# Patient Record
Sex: MALE | Race: WHITE | Employment: FULL TIME | URBAN - METROPOLITAN AREA
[De-identification: names, ages, dates, MRNs, and addresses within clinical notes are randomized per-mention and may not be internally consistent; named-entity substitution may affect disease eponyms.]

---

## 2017-05-05 ENCOUNTER — OFFICE VISIT (OUTPATIENT)
Dept: FAMILY MEDICINE CLINIC | Age: 57
End: 2017-05-05

## 2017-05-05 VITALS
SYSTOLIC BLOOD PRESSURE: 138 MMHG | DIASTOLIC BLOOD PRESSURE: 76 MMHG | TEMPERATURE: 98.3 F | BODY MASS INDEX: 28.73 KG/M2 | RESPIRATION RATE: 14 BRPM | HEART RATE: 62 BPM | HEIGHT: 69 IN | WEIGHT: 194 LBS

## 2017-05-05 DIAGNOSIS — R53.83 OTHER MALAISE AND FATIGUE: ICD-10-CM

## 2017-05-05 DIAGNOSIS — R23.2 HOT FLASH IN MALE: ICD-10-CM

## 2017-05-05 DIAGNOSIS — R42 LIGHTHEADED: ICD-10-CM

## 2017-05-05 DIAGNOSIS — Z76.89 ESTABLISHING CARE WITH NEW DOCTOR, ENCOUNTER FOR: ICD-10-CM

## 2017-05-05 DIAGNOSIS — Z00.00 ANNUAL PHYSICAL EXAM: Primary | ICD-10-CM

## 2017-05-05 DIAGNOSIS — R53.81 OTHER MALAISE AND FATIGUE: ICD-10-CM

## 2017-05-05 DIAGNOSIS — R68.82 LIBIDO, DECREASED: ICD-10-CM

## 2017-05-05 LAB
BASOPHILS ABSOLUTE: 0 K/UL (ref 0–0.2)
BASOPHILS RELATIVE PERCENT: 0.4 %
CHOLESTEROL, TOTAL: 121 MG/DL (ref 0–199)
EOSINOPHILS ABSOLUTE: 0.3 K/UL (ref 0–0.7)
EOSINOPHILS RELATIVE PERCENT: 4.2 %
HCT VFR BLD CALC: 46 % (ref 42–52)
HDLC SERPL-MCNC: 53 MG/DL (ref 40–59)
HEMOGLOBIN: 15.6 G/DL (ref 14–18)
LDL CHOLESTEROL CALCULATED: 48 MG/DL (ref 0–129)
LYMPHOCYTES ABSOLUTE: 1.5 K/UL (ref 1–4.8)
LYMPHOCYTES RELATIVE PERCENT: 20.6 %
MCH RBC QN AUTO: 29.6 PG (ref 27–31.3)
MCHC RBC AUTO-ENTMCNC: 33.8 % (ref 33–37)
MCV RBC AUTO: 87.4 FL (ref 80–100)
MONOCYTES ABSOLUTE: 0.7 K/UL (ref 0.2–0.8)
MONOCYTES RELATIVE PERCENT: 10.5 %
NEUTROPHILS ABSOLUTE: 4.6 K/UL (ref 1.4–6.5)
NEUTROPHILS RELATIVE PERCENT: 64.3 %
PDW BLD-RTO: 14.1 % (ref 11.5–14.5)
PLATELET # BLD: 257 K/UL (ref 130–400)
PROSTATE SPECIFIC ANTIGEN: 4.32 NG/ML (ref 0–3.89)
RBC # BLD: 5.26 M/UL (ref 4.7–6.1)
TRIGL SERPL-MCNC: 98 MG/DL (ref 0–200)
TSH SERPL DL<=0.05 MIU/L-ACNC: 0.65 UIU/ML (ref 0.27–4.2)
WBC # BLD: 7.1 K/UL (ref 4.8–10.8)

## 2017-05-05 PROCEDURE — 93000 ELECTROCARDIOGRAM COMPLETE: CPT | Performed by: FAMILY MEDICINE

## 2017-05-05 PROCEDURE — 99386 PREV VISIT NEW AGE 40-64: CPT | Performed by: FAMILY MEDICINE

## 2017-05-05 ASSESSMENT — PATIENT HEALTH QUESTIONNAIRE - PHQ9
SUM OF ALL RESPONSES TO PHQ9 QUESTIONS 1 & 2: 0
1. LITTLE INTEREST OR PLEASURE IN DOING THINGS: 0
2. FEELING DOWN, DEPRESSED OR HOPELESS: 0
SUM OF ALL RESPONSES TO PHQ QUESTIONS 1-9: 0

## 2017-05-06 LAB — TESTOSTERONE TOTAL-MALE: 481 NG/DL (ref 300–890)

## 2017-11-13 ENCOUNTER — TELEPHONE (OUTPATIENT)
Dept: FAMILY MEDICINE CLINIC | Age: 57
End: 2017-11-13

## 2017-11-13 ENCOUNTER — OFFICE VISIT (OUTPATIENT)
Dept: FAMILY MEDICINE CLINIC | Age: 57
End: 2017-11-13

## 2017-11-13 VITALS
WEIGHT: 185.44 LBS | DIASTOLIC BLOOD PRESSURE: 76 MMHG | BODY MASS INDEX: 27.46 KG/M2 | HEIGHT: 69 IN | OXYGEN SATURATION: 95 % | TEMPERATURE: 96.4 F | SYSTOLIC BLOOD PRESSURE: 120 MMHG | HEART RATE: 60 BPM | RESPIRATION RATE: 12 BRPM

## 2017-11-13 DIAGNOSIS — R20.2 PARESTHESIA OF HAND, BILATERAL: ICD-10-CM

## 2017-11-13 DIAGNOSIS — K21.00 GASTROESOPHAGEAL REFLUX DISEASE WITH ESOPHAGITIS: ICD-10-CM

## 2017-11-13 DIAGNOSIS — R97.20 ELEVATED PSA: ICD-10-CM

## 2017-11-13 DIAGNOSIS — R20.2 PARESTHESIA OF HAND, BILATERAL: Primary | ICD-10-CM

## 2017-11-13 LAB
FOLATE: 17.6 NG/ML (ref 7.3–26.1)
VITAMIN B-12: 1913 PG/ML (ref 211–946)
VITAMIN D 25-HYDROXY: 59.1 NG/ML (ref 30–100)

## 2017-11-13 PROCEDURE — 1036F TOBACCO NON-USER: CPT | Performed by: FAMILY MEDICINE

## 2017-11-13 PROCEDURE — 99213 OFFICE O/P EST LOW 20 MIN: CPT | Performed by: FAMILY MEDICINE

## 2017-11-13 PROCEDURE — 3017F COLORECTAL CA SCREEN DOC REV: CPT | Performed by: FAMILY MEDICINE

## 2017-11-13 PROCEDURE — G8427 DOCREV CUR MEDS BY ELIG CLIN: HCPCS | Performed by: FAMILY MEDICINE

## 2017-11-13 PROCEDURE — G8419 CALC BMI OUT NRM PARAM NOF/U: HCPCS | Performed by: FAMILY MEDICINE

## 2017-11-13 PROCEDURE — G8482 FLU IMMUNIZE ORDER/ADMIN: HCPCS | Performed by: FAMILY MEDICINE

## 2017-11-13 NOTE — PROGRESS NOTES
Subjective:      Patient ID: Ramila Harrison is a 56 y.o. male. Chief Complaint   Patient presents with    Other     Labs in May 2017 showed elevated PSA.  Numbness     both arms. Aggravated by using the arms. denies pain. Onset was a few years ago. BEA Musa is here today follow-up on his labs he needs his PSA rechecked it was slightly elevated      Been going to bathroom but more unusual no fevers chills eating drinking pretty well otherwise      Having some numbness in both arms failure by using his arms down after last few years but wanted. Hypertension her today      Does take a lot of muscle supplements as well as vitamin D and B12      Allergies   Allergen Reactions    Cephalosporins Rash     No outpatient encounter prescriptions on file as of 11/13/2017. No facility-administered encounter medications on file as of 11/13/2017. Social History     Social History    Marital status:      Spouse name: N/A    Number of children: N/A    Years of education: N/A     Occupational History    Not on file. Social History Main Topics    Smoking status: Never Smoker    Smokeless tobacco: Never Used    Alcohol use 0.6 oz/week     1 Cans of beer per week    Drug use: No    Sexual activity: Yes     Partners: Female     Other Topics Concern    Not on file     Social History Narrative    No narrative on file     Family History   Problem Relation Age of Onset    Cancer Mother      bladder cancer     High Blood Pressure Mother     High Blood Pressure Father     Cancer Father      liver cancer     No Known Problems Sister      History reviewed. No pertinent past medical history. Past Surgical History:   Procedure Laterality Date    NASAL SEPTUM SURGERY      TONSILLECTOMY           REVIEW OF SYSTEMS:   Patient seen today for exam.  Denies any problems with hearing, headaches or vision. Denies any shortness of breath, chest pain, nausea or vomiting.   No black stool, no blood in the stool. No heartburn. Denies any problems with constipation or diarrhea either. No dysuria type symptoms. Objective:     /76 (Site: Left Arm, Position: Sitting, Cuff Size: Medium Adult)   Pulse 60   Temp 96.4 °F (35.8 °C) (Temporal)   Resp 12   Ht 5' 9\" (1.753 m)   Wt 185 lb 7 oz (84.1 kg)   SpO2 95%   BMI 27.38 kg/m²     Physical Exam        O:  Alert and active male in no acute distress  HEENT:  TMs clear. Pharynx neg. Nares clear, no drainage noted  Neck supple/ no adenopathy   HEART:  RRR without murmur/ no carotid bruits  LUNGS:  Clear to auscultation bilaterally, no wheeze or rhonchi noted  THYROID: neg masses or nodularity  ABDOMEN:  Soft x4. Bowel sounds positive. No masses or organomegaly,  Negative tenderness, guarding or rebound. EXTR:  Without edema./ good pulses bilat    Neurologic exam unremarkable. DTRs in upper and lower extremities within normal limits. Full strength noted    Skin- no lesions noted   Bilateral arms full range of motion good strength all throughout positive Phalen's on the left hand    Assessment:      1. Paresthesia of hand, bilateral  Vitamin B12 & Folate    Vitamin D 25 Hydroxy   2. Elevated PSA  PSA, Total And Free   3. Gastroesophageal reflux disease with esophagitis               Plan:        No orders of the defined types were placed in this encounter.     Orders Placed This Encounter   Procedures    PSA, Total And Free     Standing Status:   Future     Number of Occurrences:   1     Standing Expiration Date:   11/13/2018    Vitamin B12 & Folate     Standing Status:   Future     Number of Occurrences:   1     Standing Expiration Date:   11/13/2018    Vitamin D 25 Hydroxy     Standing Status:   Future     Number of Occurrences:   1     Standing Expiration Date:   11/13/2018           Health Maintenance Due   Topic Date Due    Hepatitis C screen  1960    HIV screen  10/02/1977    Diabetes screen  10/02/2002    Colon cancer screen colonoscopy  10/02/2012             Controlled Substances Monitoring:       we will check his labs and see if anything is off his PSA is elevated        If anything worsens or changes please call us at once , follow-up in the office as planned,

## 2017-11-13 NOTE — TELEPHONE ENCOUNTER
Patient forgot to mention during his appt that since September 2017 he has an intermittent cough - productive of greenish sputum. No other symptoms along with the cough. Improved since onset. Would like to know if he needs meds or if this is something that will resolve on its own.     Please advise

## 2017-11-13 NOTE — TELEPHONE ENCOUNTER
He did not mention any of this to me when he was in the room we're talking about his arms PSA,, most the time to can be allergy symptoms so he should take some Claritin or Zyrtec and if things aren't improving we'll be glad to see him next week

## 2017-11-14 LAB
PROSTATE SPECIFIC ANTIGEN FREE: 0.9 NG/ML
PROSTATE SPECIFIC ANTIGEN PERCENT FREE: 22 %
PROSTATE SPECIFIC ANTIGEN: 4 NG/ML (ref 0–4)

## 2024-02-12 PROBLEM — Z00.00 ROUTINE GENERAL MEDICAL EXAMINATION AT A HEALTH CARE FACILITY: Status: ACTIVE | Noted: 2024-02-12

## 2024-02-12 PROBLEM — E55.9 VITAMIN D DEFICIENCY: Status: ACTIVE | Noted: 2024-02-12

## 2024-02-12 PROBLEM — M19.042 ARTHRITIS OF FINGER OF BOTH HANDS: Status: ACTIVE | Noted: 2024-02-12

## 2024-02-12 PROBLEM — M19.041 ARTHRITIS OF FINGER OF BOTH HANDS: Status: ACTIVE | Noted: 2024-02-12

## 2024-02-12 PROBLEM — M25.562 ACUTE PAIN OF LEFT KNEE: Status: ACTIVE | Noted: 2024-02-12

## 2024-02-12 PROBLEM — R97.20 ELEVATED PSA: Status: ACTIVE | Noted: 2024-02-12

## 2024-02-12 PROBLEM — K21.00 GASTROESOPHAGEAL REFLUX DISEASE WITH ESOPHAGITIS: Status: ACTIVE | Noted: 2017-11-13

## 2024-02-12 NOTE — H&P (VIEW-ONLY)
"Subjective   Patient ID: Betito Vicente is a 63 y.o. male who presents for Annual Exam and Hypertension.  HPI    Annual physical   Eats a generally healthy diet   Staying active   Denies any chest pain,SOB  No Abdominal pain   No black or bloody stools   Urination/BM normal   Last eye apt  3 weeks   Last dental apt 8/2023  No new family h/o cancers or heart disease     Pt is having BP issues being elevated  Ongoing for 2 years  Pt does not check BP at home    Denies any chest pain or SOB    No other concern / question     Review of systems  ; Patient seen today for exam denies any problems with headaches or vision, denies any shortness of breath chest pain nausea or vomiting, no black stool no blood in the stool no heartburn type symptoms denies any problems with constipation or diarrhea, and no dysuria-type symptoms    The patient's allergies medications were reviewed with them today    The patient's social family and surgical history or also reviewed here today, along with her past medical history.     Objective     Alert and active in  no acute distress  HEENT TMs clear oropharynx negative nares clear no drainage noted neck supple  With no adenopathy   Heart regular rate and rhythm without murmur and no carotid bruits  Lungs- clear to auscultation bilaterally, no wheeze or rhonchi noted  Thyroid -negative masses or nodularity  Abdomen- soft times four quadrants , bowel sounds positive no masses or organomegaly, negative tenderness guarding or rebound  Neurological exam unremarkable- DTRs in upper and lower extremities within normal limits.   skin -no lesions noted      /80 (BP Location: Left arm, Patient Position: Sitting, BP Cuff Size: Adult)   Pulse 56   Temp 36.4 °C (97.5 °F) (Temporal)   Resp 16   Ht 1.753 m (5' 9\")   Wt 83.9 kg (185 lb)   SpO2 97%   BMI 27.32 kg/m²     Allergies   Allergen Reactions    Cephalosporins Rash       Assessment/Plan   Problem List Items Addressed This Visit       " Elevated PSA    Relevant Orders    Prostate Specific Antigen, Screen    Vitamin D deficiency    Routine general medical examination at a health care facility    Relevant Orders    Lipid Panel    CBC and Auto Differential    Comprehensive Metabolic Panel    Tdap vaccine, age 7 years and older (Completed)    BMI 27.0-27.9,adult     Other Visit Diagnoses       Primary hypertension    -  Primary    Relevant Medications    valsartan (Diovan) 80 mg tablet    Colon cancer screening        Relevant Orders    Colonoscopy Screening; Average Risk Patient    Prostate cancer screening        Relevant Orders    Prostate Specific Antigen, Screen        Will see his back in 3 to 4 weeks discussed response to medication at that time would like to do an EKG and see where we are with things hopefully he will do his blood work his PSA was elevated recommended 2 years ago to check it every 6 months he did not do so states 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

## 2024-02-21 ENCOUNTER — OFFICE VISIT (OUTPATIENT)
Dept: PRIMARY CARE | Facility: CLINIC | Age: 64
End: 2024-02-21
Payer: COMMERCIAL

## 2024-02-21 VITALS
HEART RATE: 56 BPM | WEIGHT: 185 LBS | TEMPERATURE: 97.5 F | BODY MASS INDEX: 27.4 KG/M2 | SYSTOLIC BLOOD PRESSURE: 160 MMHG | DIASTOLIC BLOOD PRESSURE: 80 MMHG | HEIGHT: 69 IN | OXYGEN SATURATION: 97 % | RESPIRATION RATE: 16 BRPM

## 2024-02-21 DIAGNOSIS — E55.9 VITAMIN D DEFICIENCY: ICD-10-CM

## 2024-02-21 DIAGNOSIS — Z12.11 COLON CANCER SCREENING: ICD-10-CM

## 2024-02-21 DIAGNOSIS — Z12.5 PROSTATE CANCER SCREENING: ICD-10-CM

## 2024-02-21 DIAGNOSIS — M17.0 PRIMARY OSTEOARTHRITIS OF BOTH KNEES: ICD-10-CM

## 2024-02-21 DIAGNOSIS — Z00.00 ROUTINE GENERAL MEDICAL EXAMINATION AT A HEALTH CARE FACILITY: Primary | ICD-10-CM

## 2024-02-21 DIAGNOSIS — R97.20 ELEVATED PSA: ICD-10-CM

## 2024-02-21 DIAGNOSIS — I10 PRIMARY HYPERTENSION: ICD-10-CM

## 2024-02-21 PROCEDURE — 1036F TOBACCO NON-USER: CPT | Performed by: FAMILY MEDICINE

## 2024-02-21 PROCEDURE — 99396 PREV VISIT EST AGE 40-64: CPT | Performed by: FAMILY MEDICINE

## 2024-02-21 PROCEDURE — 90715 TDAP VACCINE 7 YRS/> IM: CPT | Performed by: FAMILY MEDICINE

## 2024-02-21 PROCEDURE — 3008F BODY MASS INDEX DOCD: CPT | Performed by: FAMILY MEDICINE

## 2024-02-21 PROCEDURE — 3079F DIAST BP 80-89 MM HG: CPT | Performed by: FAMILY MEDICINE

## 2024-02-21 PROCEDURE — 90471 IMMUNIZATION ADMIN: CPT | Performed by: FAMILY MEDICINE

## 2024-02-21 PROCEDURE — 3077F SYST BP >= 140 MM HG: CPT | Performed by: FAMILY MEDICINE

## 2024-02-21 RX ORDER — VALSARTAN 80 MG/1
80 TABLET ORAL DAILY
Qty: 30 TABLET | Refills: 0 | Status: SHIPPED | OUTPATIENT
Start: 2024-02-21 | End: 2024-03-22 | Stop reason: SDUPTHER

## 2024-02-21 ASSESSMENT — PATIENT HEALTH QUESTIONNAIRE - PHQ9
2. FEELING DOWN, DEPRESSED OR HOPELESS: NOT AT ALL
1. LITTLE INTEREST OR PLEASURE IN DOING THINGS: NOT AT ALL
SUM OF ALL RESPONSES TO PHQ9 QUESTIONS 1 AND 2: 0

## 2024-02-22 ENCOUNTER — APPOINTMENT (OUTPATIENT)
Dept: PRIMARY CARE | Facility: CLINIC | Age: 64
End: 2024-02-22
Payer: COMMERCIAL

## 2024-02-22 ENCOUNTER — TELEPHONE (OUTPATIENT)
Dept: PRIMARY CARE | Facility: CLINIC | Age: 64
End: 2024-02-22

## 2024-02-22 RX ORDER — DICLOFENAC SODIUM 10 MG/G
2 GEL TOPICAL 4 TIMES DAILY
Qty: 300 G | Refills: 2 | Status: SHIPPED | OUTPATIENT
Start: 2024-02-22 | End: 2024-03-22 | Stop reason: SDUPTHER

## 2024-02-22 NOTE — TELEPHONE ENCOUNTER
PATIENT CALLING, HE IS WANTING TO KNOW IF YOU CAN GIVE HIM A CALL. HE STATES THAT HE HAS A FEW QUESTIONS ABOUT HIS VISIT FROM YESTERDAY AND MEDICATION.    HE WOULD NOT PROVIDE ANY FURTHER INFORMATION.    PLEASE ADVISE.

## 2024-03-08 ENCOUNTER — HOSPITAL ENCOUNTER (OUTPATIENT)
Dept: GASTROENTEROLOGY | Facility: EXTERNAL LOCATION | Age: 64
Discharge: HOME | End: 2024-03-08
Payer: COMMERCIAL

## 2024-03-08 ENCOUNTER — ANESTHESIA (OUTPATIENT)
Dept: GASTROENTEROLOGY | Facility: EXTERNAL LOCATION | Age: 64
End: 2024-03-08

## 2024-03-08 ENCOUNTER — ANESTHESIA EVENT (OUTPATIENT)
Dept: GASTROENTEROLOGY | Facility: EXTERNAL LOCATION | Age: 64
End: 2024-03-08

## 2024-03-08 ENCOUNTER — LAB (OUTPATIENT)
Dept: LAB | Facility: LAB | Age: 64
End: 2024-03-08
Payer: COMMERCIAL

## 2024-03-08 VITALS
HEART RATE: 64 BPM | HEIGHT: 69 IN | WEIGHT: 189 LBS | OXYGEN SATURATION: 98 % | BODY MASS INDEX: 27.99 KG/M2 | RESPIRATION RATE: 15 BRPM | TEMPERATURE: 97.7 F | DIASTOLIC BLOOD PRESSURE: 90 MMHG | SYSTOLIC BLOOD PRESSURE: 134 MMHG

## 2024-03-08 DIAGNOSIS — Z12.11 COLON CANCER SCREENING: Primary | ICD-10-CM

## 2024-03-08 DIAGNOSIS — Z00.00 ROUTINE GENERAL MEDICAL EXAMINATION AT A HEALTH CARE FACILITY: ICD-10-CM

## 2024-03-08 DIAGNOSIS — Z12.5 PROSTATE CANCER SCREENING: ICD-10-CM

## 2024-03-08 DIAGNOSIS — R97.20 ELEVATED PSA: ICD-10-CM

## 2024-03-08 LAB
ALBUMIN SERPL BCP-MCNC: 4.8 G/DL (ref 3.4–5)
ALP SERPL-CCNC: 64 U/L (ref 33–136)
ALT SERPL W P-5'-P-CCNC: 35 U/L (ref 10–52)
ANION GAP SERPL CALC-SCNC: 11 MMOL/L (ref 10–20)
AST SERPL W P-5'-P-CCNC: 24 U/L (ref 9–39)
BASOPHILS # BLD AUTO: 0.04 X10*3/UL (ref 0–0.1)
BASOPHILS NFR BLD AUTO: 0.4 %
BILIRUB SERPL-MCNC: 0.7 MG/DL (ref 0–1.2)
BUN SERPL-MCNC: 10 MG/DL (ref 6–23)
CALCIUM SERPL-MCNC: 9.9 MG/DL (ref 8.6–10.3)
CHLORIDE SERPL-SCNC: 106 MMOL/L (ref 98–107)
CHOLEST SERPL-MCNC: 115 MG/DL (ref 0–199)
CHOLESTEROL/HDL RATIO: 2.6
CO2 SERPL-SCNC: 29 MMOL/L (ref 21–32)
CREAT SERPL-MCNC: 1.06 MG/DL (ref 0.5–1.3)
EGFRCR SERPLBLD CKD-EPI 2021: 80 ML/MIN/1.73M*2
EOSINOPHIL # BLD AUTO: 0.42 X10*3/UL (ref 0–0.7)
EOSINOPHIL NFR BLD AUTO: 4.6 %
ERYTHROCYTE [DISTWIDTH] IN BLOOD BY AUTOMATED COUNT: 13.4 % (ref 11.5–14.5)
GLUCOSE SERPL-MCNC: 101 MG/DL (ref 74–99)
HCT VFR BLD AUTO: 50.6 % (ref 41–52)
HDLC SERPL-MCNC: 45 MG/DL
HGB BLD-MCNC: 16.9 G/DL (ref 13.5–17.5)
IMM GRANULOCYTES # BLD AUTO: 0.02 X10*3/UL (ref 0–0.7)
IMM GRANULOCYTES NFR BLD AUTO: 0.2 % (ref 0–0.9)
LDLC SERPL CALC-MCNC: 48 MG/DL
LYMPHOCYTES # BLD AUTO: 1.63 X10*3/UL (ref 1.2–4.8)
LYMPHOCYTES NFR BLD AUTO: 17.7 %
MCH RBC QN AUTO: 29.3 PG (ref 26–34)
MCHC RBC AUTO-ENTMCNC: 33.4 G/DL (ref 32–36)
MCV RBC AUTO: 88 FL (ref 80–100)
MONOCYTES # BLD AUTO: 1.04 X10*3/UL (ref 0.1–1)
MONOCYTES NFR BLD AUTO: 11.3 %
NEUTROPHILS # BLD AUTO: 6.05 X10*3/UL (ref 1.2–7.7)
NEUTROPHILS NFR BLD AUTO: 65.8 %
NON HDL CHOLESTEROL: 70 MG/DL (ref 0–149)
NRBC BLD-RTO: 0 /100 WBCS (ref 0–0)
PLATELET # BLD AUTO: 275 X10*3/UL (ref 150–450)
POTASSIUM SERPL-SCNC: 4.9 MMOL/L (ref 3.5–5.3)
PROT SERPL-MCNC: 7 G/DL (ref 6.4–8.2)
PSA SERPL-MCNC: 6.8 NG/ML
RBC # BLD AUTO: 5.76 X10*6/UL (ref 4.5–5.9)
SODIUM SERPL-SCNC: 141 MMOL/L (ref 136–145)
TRIGL SERPL-MCNC: 109 MG/DL (ref 0–149)
VLDL: 22 MG/DL (ref 0–40)
WBC # BLD AUTO: 9.2 X10*3/UL (ref 4.4–11.3)

## 2024-03-08 PROCEDURE — 80053 COMPREHEN METABOLIC PANEL: CPT

## 2024-03-08 PROCEDURE — 80061 LIPID PANEL: CPT

## 2024-03-08 PROCEDURE — 45378 DIAGNOSTIC COLONOSCOPY: CPT | Performed by: INTERNAL MEDICINE

## 2024-03-08 PROCEDURE — 36415 COLL VENOUS BLD VENIPUNCTURE: CPT

## 2024-03-08 PROCEDURE — 84153 ASSAY OF PSA TOTAL: CPT

## 2024-03-08 PROCEDURE — 85025 COMPLETE CBC W/AUTO DIFF WBC: CPT

## 2024-03-08 RX ORDER — SODIUM CHLORIDE, SODIUM LACTATE, POTASSIUM CHLORIDE, CALCIUM CHLORIDE 600; 310; 30; 20 MG/100ML; MG/100ML; MG/100ML; MG/100ML
20 INJECTION, SOLUTION INTRAVENOUS CONTINUOUS
Status: DISCONTINUED | OUTPATIENT
Start: 2024-03-08 | End: 2024-03-09 | Stop reason: HOSPADM

## 2024-03-08 RX ORDER — SODIUM CHLORIDE 9 MG/ML
INJECTION, SOLUTION INTRAVENOUS CONTINUOUS PRN
Status: DISCONTINUED | OUTPATIENT
Start: 2024-03-08 | End: 2024-03-08

## 2024-03-08 RX ORDER — PROPOFOL 10 MG/ML
INJECTION, EMULSION INTRAVENOUS AS NEEDED
Status: DISCONTINUED | OUTPATIENT
Start: 2024-03-08 | End: 2024-03-08

## 2024-03-08 RX ADMIN — PROPOFOL 50 MG: 10 INJECTION, EMULSION INTRAVENOUS at 09:26

## 2024-03-08 RX ADMIN — PROPOFOL 50 MG: 10 INJECTION, EMULSION INTRAVENOUS at 09:30

## 2024-03-08 RX ADMIN — PROPOFOL 50 MG: 10 INJECTION, EMULSION INTRAVENOUS at 09:34

## 2024-03-08 RX ADMIN — SODIUM CHLORIDE: 9 INJECTION, SOLUTION INTRAVENOUS at 09:20

## 2024-03-08 RX ADMIN — PROPOFOL 150 MG: 10 INJECTION, EMULSION INTRAVENOUS at 09:23

## 2024-03-08 SDOH — HEALTH STABILITY: MENTAL HEALTH: CURRENT SMOKER: 0

## 2024-03-08 ASSESSMENT — PAIN SCALES - GENERAL
PAINLEVEL_OUTOF10: 0 - NO PAIN
PAINLEVEL_OUTOF10: 0 - NO PAIN
PAIN_LEVEL: 0
PAINLEVEL_OUTOF10: 0 - NO PAIN
PAINLEVEL_OUTOF10: 0 - NO PAIN

## 2024-03-08 ASSESSMENT — COLUMBIA-SUICIDE SEVERITY RATING SCALE - C-SSRS
6. HAVE YOU EVER DONE ANYTHING, STARTED TO DO ANYTHING, OR PREPARED TO DO ANYTHING TO END YOUR LIFE?: NO
2. HAVE YOU ACTUALLY HAD ANY THOUGHTS OF KILLING YOURSELF?: NO
1. IN THE PAST MONTH, HAVE YOU WISHED YOU WERE DEAD OR WISHED YOU COULD GO TO SLEEP AND NOT WAKE UP?: NO

## 2024-03-08 ASSESSMENT — PAIN - FUNCTIONAL ASSESSMENT
PAIN_FUNCTIONAL_ASSESSMENT: 0-10

## 2024-03-08 NOTE — DISCHARGE INSTRUCTIONS
Patient Instructions after a Colonoscopy      The anesthetics, sedatives or narcotics which were given to you today will be acting in your body for the next 24 hours, so you might feel a little sleepy or groggy.  This feeling should slowly wear off. Carefully read and follow the instructions.     You received sedation today:  - Do not drive or operate any machinery or power tools of any kind.   - No alcoholic beverages today, not even beer or wine.  - Do not make any important decisions or sign any legal documents.  - No over the counter medications that contain alcohol or that may cause drowsiness.  - Do not make any important decisions or sign any legal documents.    While it is common to experience mild to moderate abdominal distention, gas, or belching after your procedure, if any of these symptoms occur following discharge from the GI Lab or within one week of having your procedure, call the [facility] to be advised whether a visit to your nearest Urgent Care or Emergency Department is indicated.  Take this paper with you if you go.     - If you develop an allergic reaction to the medications that were given during your procedure such as difficulty breathing, rash, hives, severe nausea, vomiting or lightheadedness.  - If you experience chest pain, shortness of breath, severe abdominal pain, fevers and chills.  -If you develop signs and symptoms of bleeding such as blood in your spit, if your stools turn black, tarry, or bloody  - If you have not urinated within 8 hours following your procedure.  - If your IV site becomes painful, red, inflamed, or looks infected.    If you received a biopsy/polypectomy/sphincterotomy the following instructions apply below:    __ Do not use Aspirin containing products, non-steroidal medications or anti-coagulants for one week following your procedure. (Examples of these types of medications are: Advil, Arthrotec, Aleve, Coumadin, Ecotrin, Heparin, Ibuprofen,  Indocin, Motrin, Naprosyn, Nuprin, Plavix, Vioxx, and Voltarin, or their generic forms.  This list is not all-inclusive.  Check with your physician or pharmacist before resuming medications.)   __ Eat a soft diet today.  Avoid foods that are poorly digested for the next 24 hours.  These foods would include: nuts, beans, lettuce, red meats, and fried foods. Start with liquids and advance your diet as tolerated, gradually work up to eating solids.   __ Do not have a Barium Study or Enema for one week.    Your physician recommends the additional following instructions:    -You have a contact number available for emergencies. The signs and symptoms of potential delayed complications were discussed with you. You may return to normal activities tomorrow.  -Resume your previous diet.  -Continue your present medications.   -We are waiting for your pathology results.  -Your physician has recommended a repeat colonoscopy (date to be determined after pending pathology results are reviewed) for surveillance based on pathology results.  -The findings and recommendations have been discussed with you.  -The findings and recommendations were discussed with your family.  - Please see Medication Reconciliation Form for new medication/medications prescribed.       If you experience any problems or have any questions following discharge from the GI Lab, please call:        Nurse Signature                                                                        Date___________________                                                                            Patient/Responsible Party Signature                                        Date___________________

## 2024-03-08 NOTE — ANESTHESIA POSTPROCEDURE EVALUATION
Patient: Betito Vicente    Procedure Summary       Date: 03/08/24 Room / Location: [facility]    Anesthesia Start: 0919 Anesthesia Stop: 0940    Procedure: COLONOSCOPY Diagnosis: Colon cancer screening    Scheduled Providers: Ron Mackenzie MD; Shilpi Jung RN; Jelly Starks MA; TANMAY Davies Responsible Provider: TANMAY Davies    Anesthesia Type: MAC ASA Status: 2            Anesthesia Type: MAC    Vitals Value Taken Time   /63 03/08/24 0939   Temp 36.5 °C (97.7 °F) 03/08/24 0939   Pulse 76 03/08/24 0939   Resp 18 03/08/24 0939   SpO2 98 % 03/08/24 0939       Anesthesia Post Evaluation    Patient location during evaluation: PACU  Patient participation: complete - patient participated  Level of consciousness: sleepy but conscious  Pain score: 0  Pain management: adequate  Airway patency: patent  Cardiovascular status: acceptable  Respiratory status: acceptable  Hydration status: acceptable  Postoperative Nausea and Vomiting: none        No notable events documented.

## 2024-03-08 NOTE — ANESTHESIA PREPROCEDURE EVALUATION
Patient: Betito Vicente    Procedure Information       Date/Time: 03/08/24 0900    Scheduled providers: Ron Mackenzie MD; Shilpi Jung RN; Jelly Starks MA; TANMAY Davies    Procedure: COLONOSCOPY    Location: [facility]            Relevant Problems   GI   (+) Gastroesophageal reflux disease with esophagitis      Other   (+) Arthritis of finger of both hands       Clinical information reviewed:   Tobacco  Allergies  Meds   Med Hx  Surg Hx   Fam Hx  Soc Hx      Vitals:    03/08/24 0840   BP: (!) 147/99   Pulse: 73   Resp: 13   Temp: 36.4 °C (97.5 °F)   SpO2: 96%       Past Surgical History:   Procedure Laterality Date    TONSILLECTOMY       Past Medical History:   Diagnosis Date    Hypertension        Current Outpatient Medications:     diclofenac sodium (Voltaren) 1 % gel, Apply 2.25 inches (2 g) topically 4 times a day., Disp: 300 g, Rfl: 2    valsartan (Diovan) 80 mg tablet, Take 1 tablet (80 mg) by mouth once daily., Disp: 30 tablet, Rfl: 0  Prior to Admission medications    Medication Sig Start Date End Date Taking? Authorizing Provider   diclofenac sodium (Voltaren) 1 % gel Apply 2.25 inches (2 g) topically 4 times a day. 2/22/24  Yes Manolo Escamilla DO   valsartan (Diovan) 80 mg tablet Take 1 tablet (80 mg) by mouth once daily. 2/21/24 2/20/25 Yes Manolo Escamilla DO     Allergies   Allergen Reactions    Cephalosporins Rash     Social History     Tobacco Use    Smoking status: Never    Smokeless tobacco: Never   Substance Use Topics    Alcohol use: Yes     Comment: very occ         Chemistry    Lab Results   Component Value Date/Time     08/01/2022 1150    K 4.4 08/01/2022 1150     08/01/2022 1150    CO2 27 08/01/2022 1150    BUN 22 08/01/2022 1150    CREATININE 1.06 08/01/2022 1150    Lab Results   Component Value Date/Time    CALCIUM 9.0 08/01/2022 1150    ALKPHOS 52 08/01/2022 1150    AST 26 08/01/2022 1150    ALT 35 08/01/2022 1150    BILITOT 0.4  "08/01/2022 1150          Lab Results   Component Value Date/Time    WBC 6.7 08/01/2022 1150    HGB 15.1 08/01/2022 1150    HCT 45.6 08/01/2022 1150     08/01/2022 1150     No results found for: \"PROTIME\", \"PTT\", \"INR\"  No results found for this or any previous visit (from the past 4464 hour(s)).    NPO Detail:  NPO/Void Status  Carbohydrate Drink Given Prior to Surgery? : N  Date of Last Liquid: 03/07/24  Time of Last Liquid: 2000  Date of Last Solid: 03/07/24  Time of Last Solid: 0400  Last Intake Type: Clear fluids  Time of Last Void: 0842         Physical Exam    Airway  Mallampati: IV  TM distance: >3 FB  Neck ROM: full     Cardiovascular   Rhythm: regular  Rate: normal     Dental    Pulmonary   Breath sounds clear to auscultation     Abdominal   Abdomen: soft  Bowel sounds: normal           Anesthesia Plan    History of general anesthesia?: yes  History of complications of general anesthesia?: no    ASA 2     MAC     The patient is not a current smoker.  Patient was not previously instructed to abstain from smoking on day of procedure.  Education provided regarding risk of obstructive sleep apnea.  intravenous induction   Anesthetic plan and risks discussed with patient.    Plan discussed with CRNA.      "

## 2024-03-11 ENCOUNTER — TELEPHONE (OUTPATIENT)
Dept: PRIMARY CARE | Facility: CLINIC | Age: 64
End: 2024-03-11
Payer: COMMERCIAL

## 2024-03-11 DIAGNOSIS — R97.20 ELEVATED PSA: ICD-10-CM

## 2024-03-11 NOTE — TELEPHONE ENCOUNTER
----- Message from Manolo Escamilla DO sent at 3/11/2024  4:29 PM EDT -----  Reviewed his labs do look okay except his prostate is elevated again,, 2 years ago was elevated,,, because of this I recommend he see the urologist Dr. Kumar to review things he may need a biopsy of his prostate

## 2024-03-11 NOTE — ADDENDUM NOTE
Encounter addended by: Nisha Vernon RN on: 3/11/2024 12:06 PM   Actions taken: Contacts section saved, Flowsheet accepted

## 2024-03-13 ENCOUNTER — APPOINTMENT (OUTPATIENT)
Dept: PRIMARY CARE | Facility: CLINIC | Age: 64
End: 2024-03-13
Payer: COMMERCIAL

## 2024-03-15 ENCOUNTER — APPOINTMENT (OUTPATIENT)
Dept: PRIMARY CARE | Facility: CLINIC | Age: 64
End: 2024-03-15
Payer: COMMERCIAL

## 2024-03-22 ENCOUNTER — OFFICE VISIT (OUTPATIENT)
Dept: PRIMARY CARE | Facility: CLINIC | Age: 64
End: 2024-03-22
Payer: COMMERCIAL

## 2024-03-22 VITALS
DIASTOLIC BLOOD PRESSURE: 72 MMHG | RESPIRATION RATE: 16 BRPM | BODY MASS INDEX: 28.53 KG/M2 | WEIGHT: 192.6 LBS | OXYGEN SATURATION: 96 % | SYSTOLIC BLOOD PRESSURE: 132 MMHG | TEMPERATURE: 98.9 F | HEIGHT: 69 IN | HEART RATE: 58 BPM

## 2024-03-22 DIAGNOSIS — I10 PRIMARY HYPERTENSION: Primary | ICD-10-CM

## 2024-03-22 DIAGNOSIS — R97.20 ABNORMAL PSA: ICD-10-CM

## 2024-03-22 DIAGNOSIS — M17.0 PRIMARY OSTEOARTHRITIS OF BOTH KNEES: ICD-10-CM

## 2024-03-22 PROCEDURE — 3078F DIAST BP <80 MM HG: CPT | Performed by: FAMILY MEDICINE

## 2024-03-22 PROCEDURE — 3008F BODY MASS INDEX DOCD: CPT | Performed by: FAMILY MEDICINE

## 2024-03-22 PROCEDURE — 99214 OFFICE O/P EST MOD 30 MIN: CPT | Performed by: FAMILY MEDICINE

## 2024-03-22 PROCEDURE — 1036F TOBACCO NON-USER: CPT | Performed by: FAMILY MEDICINE

## 2024-03-22 PROCEDURE — 3075F SYST BP GE 130 - 139MM HG: CPT | Performed by: FAMILY MEDICINE

## 2024-03-22 RX ORDER — DICLOFENAC SODIUM 10 MG/G
2 GEL TOPICAL 4 TIMES DAILY
Qty: 600 G | Refills: 1 | Status: SHIPPED | OUTPATIENT
Start: 2024-03-22

## 2024-03-22 RX ORDER — VALSARTAN 80 MG/1
80 TABLET ORAL DAILY
Qty: 90 TABLET | Refills: 1 | Status: SHIPPED | OUTPATIENT
Start: 2024-03-22 | End: 2025-03-22

## 2024-03-22 NOTE — PROGRESS NOTES
"Subjective   Patient ID: Betito Vicente is a 63 y.o. male who presents for Hypertension.    HPI    Patient presents today for HTN follow up.     He was prescribed Valsartan 80 mg at last office visit.   Denies any side effects with this medication.   Denies swelling, headaches.   When he first took the medication he did get lightheaded, but this has resolved.   Does not check BP at home.     Would like to go over lab results.     He did set up an appointment with urology. regarding his PSA    Review of systems  ; Patient seen today for exam denies any problems with headaches or vision, denies any shortness of breath chest pain nausea or vomiting, no black stool no blood in the stool no heartburn type symptoms denies any problems with constipation or diarrhea, and no dysuria-type symptoms    The patient's allergies medications were reviewed with them today    The patient's social family and surgical history or also reviewed here today, along with her past medical history.     Objective     Alert and active in  no acute distress  HEENT TMs clear oropharynx negative nares clear no drainage noted neck supple  With no adenopathy   Heart regular rate and rhythm without murmur and no carotid bruits  Lungs- clear to auscultation bilaterally, no wheeze or rhonchi noted  Thyroid -negative masses or nodularity  Abdomen- soft times four quadrants , bowel sounds positive no masses or organomegaly, negative tenderness guarding or rebound  Neurological exam unremarkable- DTRs in upper and lower extremities within normal limits.   skin -no lesions noted      /72 (BP Location: Left arm, Patient Position: Sitting, BP Cuff Size: Adult)   Pulse 58   Temp 37.2 °C (98.9 °F) (Temporal)   Resp 16   Ht 1.753 m (5' 9\")   Wt 87.4 kg (192 lb 9.6 oz)   SpO2 96%   BMI 28.44 kg/m²     Allergies   Allergen Reactions    Cephalosporins Rash       Assessment/Plan   Problem List Items Addressed This Visit    None  Visit Diagnoses       " Abnormal PSA    -  Primary    Primary hypertension        Relevant Medications    valsartan (Diovan) 80 mg tablet    Primary osteoarthritis of both knees        Relevant Medications    diclofenac sodium (Voltaren) 1 % gel    BMI 28.0-28.9,adult               we will go ahead and continue his valsartan as he is doing well with this will be seeing Dr. Kumar for follow-up regarding PSA most likely watchful waiting we will keep a close eye on things understands importance close follow-up with us    If anything worsens or changes please call us at once, follow up in the office as planned,

## 2024-05-01 ENCOUNTER — OFFICE VISIT (OUTPATIENT)
Dept: UROLOGY | Facility: CLINIC | Age: 64
End: 2024-05-01
Payer: COMMERCIAL

## 2024-05-01 VITALS
HEIGHT: 69 IN | BODY MASS INDEX: 27.4 KG/M2 | HEART RATE: 57 BPM | WEIGHT: 185 LBS | SYSTOLIC BLOOD PRESSURE: 159 MMHG | DIASTOLIC BLOOD PRESSURE: 86 MMHG

## 2024-05-01 DIAGNOSIS — R97.20 ELEVATED PSA: ICD-10-CM

## 2024-05-01 DIAGNOSIS — R39.16 STRAINING TO VOID: ICD-10-CM

## 2024-05-01 PROCEDURE — 3008F BODY MASS INDEX DOCD: CPT | Performed by: UROLOGY

## 2024-05-01 PROCEDURE — 99203 OFFICE O/P NEW LOW 30 MIN: CPT | Performed by: UROLOGY

## 2024-05-01 RX ORDER — TAMSULOSIN HYDROCHLORIDE 0.4 MG/1
0.4 CAPSULE ORAL DAILY
Qty: 30 CAPSULE | Refills: 5 | Status: SHIPPED | OUTPATIENT
Start: 2024-05-01 | End: 2024-10-28

## 2024-05-01 NOTE — PROGRESS NOTES
Subjective   Patient ID: Betito Vicente is a 63 y.o. male new patient kindly referred by Dr. Manolo Escamilla who presents for Elevated PSA.    HPI  The patient states that he knows his prostate is enlarged and relates that he has a weak stream and needs patience to void. He does not always feel empty after voiding. He voids a varied amount through the day. He has nocturia every 1.5 to 2 hours. Patient denies hematuria and dysuria. He denies a history of prostate cancer.     Patient denies pacemaker, plates, and screws.     PSA Results  Component  Ref Range & Units 1 mo ago 2 yr ago   Prostate Specific Antigen,Screen  <=4.00 ng/mL 6.80 High  4.30 High  R     Past Medical History  Past Medical History:   Diagnosis Date    Hypertension         Surgical History  Past Surgical History:   Procedure Laterality Date    TONSILLECTOMY           Social History  He reports that he has never smoked. He has never used smokeless tobacco. He reports current alcohol use. He reports that he does not use drugs.    Family History  Family History   Family history unknown: Yes       Medications    Current Outpatient Medications:     diclofenac sodium (Voltaren) 1 % gel, Apply 2.25 inches (2 g) topically 4 times a day., Disp: 600 g, Rfl: 1    valsartan (Diovan) 80 mg tablet, Take 1 tablet (80 mg) by mouth once daily., Disp: 90 tablet, Rfl: 1     Allergies  Cephalosporins     Review of Systems  A 12 system review was completed and is negative with the exception of those signs and symptoms noted in the history of present illness.    Objective   Physical Exam  General: in NAD, appears stated age  Head: normocephalic, atraumatic  Neck: supple; trachea is midline  Respiratory: normal effort, no use of accessory muscles  Cardiovascular: no peripheral edema  Abdomen: soft, nondistended, nontender, no rebound or guarding, no organomegaly, no CVA tenderness, no hernia  Lymphatic: no lymphadenopathy noted  Skin: normal turgor, no rashes  Neurologic:  grossly intact, oriented to person/place/time  Psychiatric: mode and affect appropriate  : normal phallus, normal meatus, scrotum normal, testicles normal bilaterally, epididymis normal bilaterally  NEREYDA: normal tone, no hemorrhoids, enlarged prostate smooth/nontender/no nodules    Assessment/Plan   Problem List Items Addressed This Visit             ICD-10-CM    Elevated PSA R97.20    Relevant Orders    MR prostate with alessia boundaries    Creatinine     Other Visit Diagnoses         Codes    Straining to void     R39.16    Relevant Medications    tamsulosin (Flomax) 0.4 mg 24 hr capsule          NEREYDA is positive for enlarged prostate but otherwise unremarkable. We discussed the role of PSA in screening for prostate cancer. I recommend that we schedule an MRI of the prostate to see if there are any lesions visible. If there is a lesion detected, it will facilitate an MRI targeted biopsy. If there is nothing found on the MRI, that does not rule out a cancer. We can still investigate further with a general IO biopsy. Start tamsulosin 0.4 mg nightly. Schedule MRI and follow-up with results.     Scribe Attestation  By signing my name below, I, Doridna Collins , Ariana   attest that this documentation has been prepared under the direction and in the presence of Mohan Kumar MD.

## 2024-05-22 ENCOUNTER — APPOINTMENT (OUTPATIENT)
Dept: RADIOLOGY | Facility: CLINIC | Age: 64
End: 2024-05-22
Payer: COMMERCIAL

## 2024-05-23 ENCOUNTER — LAB (OUTPATIENT)
Dept: LAB | Facility: LAB | Age: 64
End: 2024-05-23
Payer: COMMERCIAL

## 2024-05-23 DIAGNOSIS — R97.20 ELEVATED PSA: ICD-10-CM

## 2024-05-23 LAB
CREAT SERPL-MCNC: 1.03 MG/DL (ref 0.5–1.3)
EGFRCR SERPLBLD CKD-EPI 2021: 83 ML/MIN/1.73M*2

## 2024-05-23 PROCEDURE — 36415 COLL VENOUS BLD VENIPUNCTURE: CPT

## 2024-05-23 PROCEDURE — 82565 ASSAY OF CREATININE: CPT

## 2024-05-31 ENCOUNTER — HOSPITAL ENCOUNTER (OUTPATIENT)
Dept: RADIOLOGY | Facility: CLINIC | Age: 64
Discharge: HOME | End: 2024-05-31
Payer: COMMERCIAL

## 2024-05-31 DIAGNOSIS — R97.20 ELEVATED PSA: ICD-10-CM

## 2024-05-31 PROCEDURE — 72197 MRI PELVIS W/O & W/DYE: CPT | Performed by: RADIOLOGY

## 2024-05-31 PROCEDURE — 2550000001 HC RX 255 CONTRASTS: Performed by: UROLOGY

## 2024-05-31 PROCEDURE — A9575 INJ GADOTERATE MEGLUMI 0.1ML: HCPCS | Performed by: UROLOGY

## 2024-05-31 PROCEDURE — 72197 MRI PELVIS W/O & W/DYE: CPT

## 2024-05-31 RX ORDER — GADOTERATE MEGLUMINE 376.9 MG/ML
16 INJECTION INTRAVENOUS
Status: COMPLETED | OUTPATIENT
Start: 2024-05-31 | End: 2024-05-31

## 2024-05-31 RX ADMIN — GADOTERATE MEGLUMINE 16 ML: 376.9 INJECTION INTRAVENOUS at 14:16

## 2024-06-06 ENCOUNTER — APPOINTMENT (OUTPATIENT)
Dept: UROLOGY | Facility: CLINIC | Age: 64
End: 2024-06-06
Payer: COMMERCIAL

## 2024-07-23 ENCOUNTER — APPOINTMENT (OUTPATIENT)
Dept: UROLOGY | Facility: CLINIC | Age: 64
End: 2024-07-23
Payer: COMMERCIAL

## 2024-07-23 VITALS
WEIGHT: 186 LBS | HEIGHT: 68 IN | TEMPERATURE: 98.2 F | HEART RATE: 75 BPM | BODY MASS INDEX: 28.19 KG/M2 | SYSTOLIC BLOOD PRESSURE: 157 MMHG | DIASTOLIC BLOOD PRESSURE: 85 MMHG

## 2024-07-23 DIAGNOSIS — N40.0 BPH WITH ELEVATED PSA: ICD-10-CM

## 2024-07-23 DIAGNOSIS — R97.20 ELEVATED PSA: Primary | ICD-10-CM

## 2024-07-23 DIAGNOSIS — R97.20 BPH WITH ELEVATED PSA: ICD-10-CM

## 2024-07-23 PROCEDURE — 99214 OFFICE O/P EST MOD 30 MIN: CPT | Performed by: UROLOGY

## 2024-07-23 PROCEDURE — 3008F BODY MASS INDEX DOCD: CPT | Performed by: UROLOGY

## 2024-08-23 ENCOUNTER — APPOINTMENT (OUTPATIENT)
Dept: UROLOGY | Facility: HOSPITAL | Age: 64
End: 2024-08-23
Payer: COMMERCIAL

## 2024-08-23 RX ORDER — GENTAMICIN 40 MG/ML
120 INJECTION, SOLUTION INTRAMUSCULAR; INTRAVENOUS ONCE
Status: DISCONTINUED | OUTPATIENT
Start: 2024-08-23 | End: 2024-08-27

## 2024-08-27 ENCOUNTER — APPOINTMENT (OUTPATIENT)
Dept: UROLOGY | Facility: CLINIC | Age: 64
End: 2024-08-27
Payer: COMMERCIAL

## 2024-08-27 VITALS
DIASTOLIC BLOOD PRESSURE: 80 MMHG | WEIGHT: 186 LBS | BODY MASS INDEX: 28.19 KG/M2 | HEART RATE: 56 BPM | TEMPERATURE: 98.1 F | SYSTOLIC BLOOD PRESSURE: 130 MMHG | HEIGHT: 68 IN

## 2024-08-27 DIAGNOSIS — R97.20 ELEVATED PSA: ICD-10-CM

## 2024-08-27 LAB
POC APPEARANCE, URINE: CLEAR
POC BILIRUBIN, URINE: NEGATIVE
POC BLOOD, URINE: NEGATIVE
POC COLOR, URINE: YELLOW
POC GLUCOSE, URINE: NEGATIVE MG/DL
POC KETONES, URINE: NEGATIVE MG/DL
POC LEUKOCYTES, URINE: NEGATIVE
POC NITRITE,URINE: NEGATIVE
POC PH, URINE: 5.5 PH
POC PROTEIN, URINE: NEGATIVE MG/DL
POC SPECIFIC GRAVITY, URINE: 1.02
POC UROBILINOGEN, URINE: 0.2 EU/DL

## 2024-08-27 PROCEDURE — 88305 TISSUE EXAM BY PATHOLOGIST: CPT

## 2024-08-27 PROCEDURE — 76942 ECHO GUIDE FOR BIOPSY: CPT | Performed by: UROLOGY

## 2024-08-27 PROCEDURE — 96372 THER/PROPH/DIAG INJ SC/IM: CPT | Performed by: UROLOGY

## 2024-08-27 PROCEDURE — 88305 TISSUE EXAM BY PATHOLOGIST: CPT | Performed by: PATHOLOGY

## 2024-08-27 PROCEDURE — 76872 US TRANSRECTAL: CPT | Performed by: UROLOGY

## 2024-08-27 PROCEDURE — 81003 URINALYSIS AUTO W/O SCOPE: CPT | Performed by: UROLOGY

## 2024-08-27 PROCEDURE — 55700 PR PROSTATE NEEDLE BIOPSY ANY APPROACH: CPT | Performed by: UROLOGY

## 2024-08-27 RX ORDER — GENTAMICIN 40 MG/ML
120 INJECTION, SOLUTION INTRAMUSCULAR; INTRAVENOUS ONCE
Status: COMPLETED | OUTPATIENT
Start: 2024-08-27 | End: 2024-08-27

## 2024-08-27 NOTE — PROGRESS NOTES
Subjective   Patient ID: Betito Vicente is a 63 y.o. male who presents for Elevated PSA. Last seen 7/23/24 when We reviewed MRI findings together. Prostate weight is estimated at 92.7 g PI-RADS 2.  PI-RADS 2 does not completely rule out a low-grade cancer. I explained the biopsy procedure to the patient. I also explained what to expect after the procedure. We will schedule standard IO biopsy and follow-up with pathology results.   I suggested trying finasteride but it of course takes >6 months to work. We will wait to start finasteride until after the biopsy results.     HPI        Review of Systems  A 12 system review was completed and is negative with the exception of those signs and symptoms noted in the history of present illness.    Objective   Physical Exam  General: in NAD, appears stated age  Head: normocephalic, atraumatic  Respiratory: normal effort, no use of accessory muscles  Cardiovascular: no edema noted  Skin: normal turgor, no rashes  Neurologic: grossly intact, oriented to person/place/time  Psychiatric: mode and affect appropriate     Procedure  Transrectal ultrasound was placed per rectum. 10 mL's of 1% lidocaine were used to perform a periprosthetic block. Static images were obtained to calculated prostate volume. Prostate measured 78.6 grams.     12 biopsies were then taken from the prostate under ultrasound guidance - 2 from the left base, 2 from the left midportion, 2 from the left apex, 2 from the right base, 2 from the right midportion, and 2 from the right apex. Specimens was sent to the pathology. Transrectal ultrasound-guided probe was removed. No significant bleeding was noted. The patient tolerated the procedure well.       Assessment/Plan   Problem List Items Addressed This Visit             ICD-10-CM    Elevated PSA R97.20    Relevant Medications    gentamicin (Garamycin) injection 120 mg (Completed)    Other Relevant Orders    POCT UA Automated manually resulted (Completed)     Surgical Pathology Exam     The patient tolerated the IO prostate biopsy procedure well.  We will see the patient back in 2 weeks for pathology results.       Scribe Attestation  By signing my name below, I, Ariana Calero   attest that this documentation has been prepared under the direction and in the presence of Mohan Kumar MD.

## 2024-09-05 LAB
LABORATORY COMMENT REPORT: NORMAL
PATH REPORT.FINAL DX SPEC: NORMAL
PATH REPORT.GROSS SPEC: NORMAL
PATH REPORT.RELEVANT HX SPEC: NORMAL
PATH REPORT.TOTAL CANCER: NORMAL

## 2024-09-17 ENCOUNTER — APPOINTMENT (OUTPATIENT)
Dept: UROLOGY | Facility: CLINIC | Age: 64
End: 2024-09-17
Payer: COMMERCIAL

## 2024-09-23 ENCOUNTER — APPOINTMENT (OUTPATIENT)
Dept: PRIMARY CARE | Facility: CLINIC | Age: 64
End: 2024-09-23
Payer: COMMERCIAL

## 2024-09-23 VITALS
BODY MASS INDEX: 28.14 KG/M2 | OXYGEN SATURATION: 98 % | SYSTOLIC BLOOD PRESSURE: 138 MMHG | HEIGHT: 69 IN | HEART RATE: 78 BPM | DIASTOLIC BLOOD PRESSURE: 80 MMHG | WEIGHT: 190 LBS

## 2024-09-23 DIAGNOSIS — M79.642 BILATERAL HAND PAIN: ICD-10-CM

## 2024-09-23 DIAGNOSIS — M17.0 PRIMARY OSTEOARTHRITIS OF BOTH KNEES: ICD-10-CM

## 2024-09-23 DIAGNOSIS — M79.641 BILATERAL HAND PAIN: ICD-10-CM

## 2024-09-23 DIAGNOSIS — E55.9 VITAMIN D DEFICIENCY: ICD-10-CM

## 2024-09-23 DIAGNOSIS — M25.519 ARTHRALGIA OF SHOULDER, UNSPECIFIED LATERALITY: ICD-10-CM

## 2024-09-23 DIAGNOSIS — M79.602 BILATERAL ARM PAIN: Primary | ICD-10-CM

## 2024-09-23 DIAGNOSIS — I10 PRIMARY HYPERTENSION: ICD-10-CM

## 2024-09-23 DIAGNOSIS — M79.601 BILATERAL ARM PAIN: Primary | ICD-10-CM

## 2024-09-23 PROCEDURE — 3008F BODY MASS INDEX DOCD: CPT | Performed by: FAMILY MEDICINE

## 2024-09-23 PROCEDURE — 99214 OFFICE O/P EST MOD 30 MIN: CPT | Performed by: FAMILY MEDICINE

## 2024-09-23 PROCEDURE — 1036F TOBACCO NON-USER: CPT | Performed by: FAMILY MEDICINE

## 2024-09-23 PROCEDURE — 3079F DIAST BP 80-89 MM HG: CPT | Performed by: FAMILY MEDICINE

## 2024-09-23 PROCEDURE — 3075F SYST BP GE 130 - 139MM HG: CPT | Performed by: FAMILY MEDICINE

## 2024-09-23 ASSESSMENT — PATIENT HEALTH QUESTIONNAIRE - PHQ9
1. LITTLE INTEREST OR PLEASURE IN DOING THINGS: NOT AT ALL
SUM OF ALL RESPONSES TO PHQ9 QUESTIONS 1 AND 2: 0
2. FEELING DOWN, DEPRESSED OR HOPELESS: NOT AT ALL

## 2024-09-23 ASSESSMENT — ENCOUNTER SYMPTOMS
DEPRESSION: 0
LOSS OF SENSATION IN FEET: 0
OCCASIONAL FEELINGS OF UNSTEADINESS: 0

## 2024-09-23 NOTE — PROGRESS NOTES
Subjective   Patient ID: Betito Vicente is a 63 y.o. male who presents for Hypertension and Numbness.    HPI    Patient presents today for HTN follow up. He does eat a low sodium diet.  He does exercise.  He does not check his BP at home.  Last BW was 3/8/24.    Pt states he has been having numbness in his arms that starts at shoulder and goes into his fingers.Pt states the pain scale is 8/10 and can be shooting at times.  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    Numbness in hands , none worse equally both sides he states definitely seems worse when he does a lot more work with it he has had some friends with carpal tunnel social [place] with that we discussed that with him here at length today    Prostate things have seemed to calm down look at his biopsies with him everything looks okay MRI looks okay we will discuss that further with urology to me put him on something such as Proscar or Avodart to help with his flow and help shrink down his prostate      Review of systems  ; Patient seen today for exam denies any problems with headaches or vision, denies any shortness of breath chest pain nausea or vomiting, no black stool no blood in the stool no heartburn type symptoms denies any problems with constipation or diarrhea, and no dysuria-type symptoms    The patient's allergies medications were reviewed with them today    The patient's social family and surgical history or also reviewed here today, along with her past medical history.     Objective     Alert and active in  no acute distress  HEENT TMs  "clear oropharynx negative nares clear no drainage noted neck supple  With no adenopathy   Heart regular rate and rhythm without murmur and no carotid bruits  Lungs- clear to auscultation bilaterally, no wheeze or rhonchi noted  Thyroid -negative masses or nodularity  Abdomen- soft times four quadrants , bowel sounds positive no masses or organomegaly, negative tenderness guarding or rebound  Neurological exam unremarkable- DTRs in upper and lower extremities within normal limits.   skin -no lesions noted    Bilateral hands show swelling at the DIP PIP joint bilaterally    Pos phalens POS plus or minus Tinel's      /80 (BP Location: Left arm, Patient Position: Sitting, BP Cuff Size: Adult)   Pulse 78   Ht 1.753 m (5' 9\")   Wt 86.2 kg (190 lb)   SpO2 98%   BMI 28.06 kg/m²     Allergies   Allergen Reactions    Cephalosporins Rash       Assessment/Plan   Problem List Items Addressed This Visit       Vitamin D deficiency     Other Visit Diagnoses       Bilateral arm pain    -  Primary    Relevant Orders    Tsh With Reflex To Free T4 If Abnormal    EMG & nerve conduction    Primary hypertension        Primary osteoarthritis of both knees        Arthralgia of shoulder, unspecified laterality        Relevant Orders    SHEBEA with Reflex to YOLANDA    Rheumatoid Factor    Sedimentation Rate    Uric Acid    C-Reactive Protein    Bilateral hand pain            We will set up a nerve conductions study for him to rule out carpal tunnel and also get some rheumatoid tests, 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

## 2024-09-24 ENCOUNTER — APPOINTMENT (OUTPATIENT)
Dept: UROLOGY | Facility: CLINIC | Age: 64
End: 2024-09-24
Payer: COMMERCIAL

## 2024-09-24 VITALS
WEIGHT: 189 LBS | SYSTOLIC BLOOD PRESSURE: 166 MMHG | HEART RATE: 60 BPM | BODY MASS INDEX: 27.91 KG/M2 | DIASTOLIC BLOOD PRESSURE: 76 MMHG | TEMPERATURE: 99.1 F

## 2024-09-24 DIAGNOSIS — N40.0 BPH WITH ELEVATED PSA: ICD-10-CM

## 2024-09-24 DIAGNOSIS — R97.20 BPH WITH ELEVATED PSA: ICD-10-CM

## 2024-09-24 DIAGNOSIS — R97.20 ELEVATED PSA: Primary | ICD-10-CM

## 2024-09-24 PROCEDURE — 99214 OFFICE O/P EST MOD 30 MIN: CPT | Performed by: UROLOGY

## 2024-09-24 RX ORDER — FINASTERIDE 5 MG/1
5 TABLET, FILM COATED ORAL DAILY
Qty: 30 TABLET | Refills: 11 | Status: SHIPPED | OUTPATIENT
Start: 2024-09-24 | End: 2025-09-19

## 2024-09-24 NOTE — PROGRESS NOTES
Subjective   Patient ID: Betito Vicente is a 63 y.o. male who presents for pathology results. Patient is s/p IO prostate biopsy on 8/27/24.     HPI  The patient is doing well since the biopsy and hematuria has resolved. The patient denies dysuria, fever, and chills. The patient is taking tamsulosin and he feels it is helping a little.     Pathology Results  FINAL DIAGNOSIS     A., B., C., D., E., F.  PROSTATE, LEFT APEX, LEFT MID, LEFT BASE, RIGHT APEX, RIGHT MID, RIGHT BASE, NEEDLE BIOPSIES:  --PROSTATE TISSUE WITH PATCHY CHRONIC INFLAMMATION.  --NO MALIGNANCY IS SEEN.     Review of Systems  A 12 system review was completed and is negative with the exception of those signs and symptoms noted in the history of present illness.    Objective   Physical Exam  General: in NAD, appears stated age  Head: normocephalic, atraumatic  Respiratory: normal effort, no use of accessory muscles  Cardiovascular: no edema noted  Skin: normal turgor, no rashes  Neurologic: grossly intact, oriented to person/place/time  Psychiatric: mode and affect appropriate     Assessment/Plan   Problem List Items Addressed This Visit             ICD-10-CM    Elevated PSA - Primary R97.20    Relevant Medications    finasteride (Proscar) 5 mg tablet    Other Relevant Orders    Prostate Specific Antigen    Follow Up In Urology     Other Visit Diagnoses         Codes    BPH with elevated PSA     N40.0, R97.20    Relevant Medications    finasteride (Proscar) 5 mg tablet    Other Relevant Orders    Prostate Specific Antigen    Follow Up In Urology          Biopsy findings of prostatic tissue with patchy inflammation and negative for malignancy. Order finasteride 5 mg daily. The patient is aware of possible side effects and understands that it will take 6 to 12 months to become effective. Order PSA for 6 months and follow-up with results.       Scribe Attestation  By signing my name below, I, Ariana Calero   attest that this documentation has been  prepared under the direction and in the presence of Mohan Kumar MD.

## 2024-09-29 DIAGNOSIS — I10 PRIMARY HYPERTENSION: ICD-10-CM

## 2024-09-30 RX ORDER — VALSARTAN 80 MG/1
80 TABLET ORAL DAILY
Qty: 90 TABLET | Refills: 0 | Status: SHIPPED | OUTPATIENT
Start: 2024-09-30

## 2024-11-06 DIAGNOSIS — R39.16 STRAINING TO VOID: ICD-10-CM

## 2024-11-06 NOTE — TELEPHONE ENCOUNTER
Last refill 5/1/24  Last appt 9/24/24  Next appt 3/25/25    Medication no longer on active list. Please advise.

## 2024-11-07 RX ORDER — TAMSULOSIN HYDROCHLORIDE 0.4 MG/1
0.4 CAPSULE ORAL DAILY
Qty: 30 CAPSULE | Refills: 0 | Status: SHIPPED | OUTPATIENT
Start: 2024-11-07

## 2024-11-18 ENCOUNTER — TELEPHONE (OUTPATIENT)
Dept: PRIMARY CARE | Facility: CLINIC | Age: 64
End: 2024-11-18

## 2024-11-18 ENCOUNTER — HOSPITAL ENCOUNTER (OUTPATIENT)
Dept: NEUROLOGY | Facility: HOSPITAL | Age: 64
Discharge: HOME | End: 2024-11-18
Payer: COMMERCIAL

## 2024-11-18 DIAGNOSIS — G56.03 CARPAL TUNNEL SYNDROME, BILATERAL: ICD-10-CM

## 2024-11-18 DIAGNOSIS — M79.601 BILATERAL ARM PAIN: ICD-10-CM

## 2024-11-18 DIAGNOSIS — M79.602 BILATERAL ARM PAIN: ICD-10-CM

## 2024-11-18 PROCEDURE — 95886 MUSC TEST DONE W/N TEST COMP: CPT | Performed by: PSYCHIATRY & NEUROLOGY

## 2024-11-18 PROCEDURE — 95911 NRV CNDJ TEST 9-10 STUDIES: CPT | Performed by: PSYCHIATRY & NEUROLOGY

## 2024-11-18 NOTE — TELEPHONE ENCOUNTER
Manolo Escamilla, DO [facility] [id number] [id number] Clinical Support Staff  Nerve test is showing moderate to severe carpal tunnel syndrome,,, would like to get impression from a hand surgeon,, Dr. Hutchins,,, if his pain continues and he is miserable that is the major treatment is to have surgery on these

## 2024-11-18 NOTE — ADDENDUM NOTE
Encounter addended by: Erna Mcguire, RT on: 11/18/2024 1:10 PM   Actions taken: Imaging Exam ended, Check Out activity completed

## 2024-11-18 NOTE — TELEPHONE ENCOUNTER
----- Message from Manolo Escamilla sent at 11/18/2024  2:09 PM EST -----  Nerve test is showing moderate to severe carpal tunnel syndrome,,, would like to get impression from a hand surgeon,, Dr. Hutchins,,, if his pain continues and he is miserable that is the major treatment is to have surgery on these

## 2024-12-06 ENCOUNTER — APPOINTMENT (OUTPATIENT)
Dept: ORTHOPEDIC SURGERY | Facility: CLINIC | Age: 64
End: 2024-12-06
Payer: COMMERCIAL

## 2024-12-06 ENCOUNTER — OFFICE VISIT (OUTPATIENT)
Dept: ORTHOPEDIC SURGERY | Facility: CLINIC | Age: 64
End: 2024-12-06
Payer: COMMERCIAL

## 2024-12-06 DIAGNOSIS — G56.03 CARPAL TUNNEL SYNDROME, BILATERAL: ICD-10-CM

## 2024-12-06 NOTE — PROGRESS NOTES
History of Present Illness:  Presents with  bilateral hand numbness. The symptoms have been present for months. The patient denies any inciting trauma. The numbness primarily involves the thumb, index finger, and long finger. There is minimal numbness in the small finger. The patient complains of intermittant, moderate hand pain which is worse at night. It causes frequent night awakenings. The patient presents due to worsening symptoms.     EMG with bilateral moderate severe carpal tunnel.    Review of Systems   GENERAL: Negative for malaise, significant weight loss, fever  MUSCULOSKELETAL: see HPI  NEURO:  Negative    The patient's past medical history, family history, social history, and review of systems were reviewed. History is otherwise negative except as stated in the HPI.    Physical Examination:  General: Alert and oriented to person, place, and time.  No acute distress and breathing comfortably: Pleasant and cooperative with examination.  HEENT: Head is normocephalic and atraumatic.  Neck: Supple, no visible swelling.  Cardiovascular: No palpable tachycardia  Lungs: No audible wheezing or labored breathing  Abdomen: Nondistended.  On musculoskeletal examination, the patient has full elbow range of motion. There is no tenderness to palpation about the lateral epicondyle. Tinels at the cubital tunnel and elbow flexion/compression are negative for ulnar nerve symptoms. In regards to the wrist, there is no obvious deformity. Range of motion is full in flexion, extension, pronation, and supination. Strength is 5/5 in flexion and extension. There is no tenderness to palpation about the 1st dorsal compartment, the 1st CMC joint, or the TFCC. Tinels at the carpal tunnel and Durkins compression test are positive. The patient has subjective paresthesias in the median nerve distribution. Sensation and motor function are intact in the radial, and ulnar nerve distribution. There is no obvious thenar atrophy, and  thenar strength is 5/5. There is no intrinsic atrophy, and intrinsic strength is 5/5. All fingers are without triggering and are without pain over the A1 pulley. The patient can make a full composite fist. The hand itself is warm and well perfused. The skin is intact throughout. The contralateral hand/wrist are normal to inspection, range of motion, stability, and strength.    Imaging:  N/A    Assessment:  Patient with bilateral moderate severe carpal tunnel syndrome. EMG confirmed.  Discussed options at length with patient today.  He is unsure what he would like to pursue.  Would like to go home and discuss with his wife regarding injections versus surgery.  Did inform him that I'm out the remainder of December.  If he would like to present in December for injections I would have him follow-up with my hand partner Dr. Lisa.  I have provided him with [facility]raz[facility]'s card to schedule injections if he would like to proceed in the interim.    Plan:   Observation.  Unsure if he would like to proceed with cortisone injections versus surgery.  He will call G[facility] in December if he decides either way. Otherwise I am happy to see him back when I return in January.     Marizol Wynne MD  Orthopaedic Surgeon

## 2024-12-13 DIAGNOSIS — R39.16 STRAINING TO VOID: ICD-10-CM

## 2024-12-13 RX ORDER — TAMSULOSIN HYDROCHLORIDE 0.4 MG/1
0.4 CAPSULE ORAL DAILY
Qty: 30 CAPSULE | Refills: 11 | Status: SHIPPED | OUTPATIENT
Start: 2024-12-13

## 2025-01-02 DIAGNOSIS — I10 PRIMARY HYPERTENSION: ICD-10-CM

## 2025-01-03 RX ORDER — VALSARTAN 80 MG/1
80 TABLET ORAL DAILY
Qty: 90 TABLET | Refills: 0 | Status: SHIPPED | OUTPATIENT
Start: 2025-01-03

## 2025-01-28 DIAGNOSIS — I10 PRIMARY HYPERTENSION: ICD-10-CM

## 2025-01-29 RX ORDER — VALSARTAN 80 MG/1
80 TABLET ORAL DAILY
Qty: 90 TABLET | Refills: 0 | Status: SHIPPED | OUTPATIENT
Start: 2025-01-29

## 2025-02-17 DIAGNOSIS — R39.16 STRAINING TO VOID: ICD-10-CM

## 2025-02-17 DIAGNOSIS — R97.20 ELEVATED PSA: ICD-10-CM

## 2025-02-17 DIAGNOSIS — R97.20 BPH WITH ELEVATED PSA: ICD-10-CM

## 2025-02-17 DIAGNOSIS — N40.0 BPH WITH ELEVATED PSA: ICD-10-CM

## 2025-02-17 RX ORDER — TAMSULOSIN HYDROCHLORIDE 0.4 MG/1
0.4 CAPSULE ORAL DAILY
Qty: 90 CAPSULE | Refills: 3 | Status: SHIPPED | OUTPATIENT
Start: 2025-02-17 | End: 2026-02-17

## 2025-02-17 RX ORDER — FINASTERIDE 5 MG/1
5 TABLET, FILM COATED ORAL DAILY
Qty: 90 TABLET | Refills: 3 | Status: SHIPPED | OUTPATIENT
Start: 2025-02-17 | End: 2026-02-17

## 2025-02-24 ENCOUNTER — OFFICE VISIT (OUTPATIENT)
Dept: ORTHOPEDIC SURGERY | Facility: CLINIC | Age: 65
End: 2025-02-24
Payer: COMMERCIAL

## 2025-02-24 DIAGNOSIS — G56.03 CARPAL TUNNEL SYNDROME, BILATERAL: Primary | ICD-10-CM

## 2025-02-24 PROCEDURE — 99213 OFFICE O/P EST LOW 20 MIN: CPT | Performed by: STUDENT IN AN ORGANIZED HEALTH CARE EDUCATION/TRAINING PROGRAM

## 2025-02-24 NOTE — PROGRESS NOTES
History of Present Illness:  Presents with  bilateral hand numbness. The symptoms have been present for months. The patient denies any inciting trauma. The numbness primarily involves the thumb, index finger, and long finger. There is minimal numbness in the small finger. The patient complains of intermittant, moderate hand pain which is worse at night. It causes frequent night awakenings. The patient presents due to worsening symptoms.  Patient was seen in December 2024, to schedule surgery versus cortisone injections was done at that time.  Patient would like to proceed with cortisone injections to bilateral wrist today.    EMG with bilateral moderate severe carpal tunnel.    Review of Systems   GENERAL: Negative for malaise, significant weight loss, fever  MUSCULOSKELETAL: see HPI  NEURO:  Negative    The patient's past medical history, family history, social history, and review of systems were reviewed. History is otherwise negative except as stated in the HPI.    Physical Examination:  General: Alert and oriented to person, place, and time.  No acute distress and breathing comfortably: Pleasant and cooperative with examination.  HEENT: Head is normocephalic and atraumatic.  Neck: Supple, no visible swelling.  Cardiovascular: No palpable tachycardia  Lungs: No audible wheezing or labored breathing  Abdomen: Nondistended.  On musculoskeletal examination, the patient has full elbow range of motion. There is no tenderness to palpation about the lateral epicondyle. Tinels at the cubital tunnel and elbow flexion/compression are negative for ulnar nerve symptoms. In regards to the wrist, there is no obvious deformity. Range of motion is full in flexion, extension, pronation, and supination. Strength is 5/5 in flexion and extension. There is no tenderness to palpation about the 1st dorsal compartment, the 1st CMC joint, or the TFCC. Tinels at the carpal tunnel and Durkins compression test are positive. The patient  has subjective paresthesias in the median nerve distribution. Sensation and motor function are intact in the radial, and ulnar nerve distribution. There is no obvious thenar atrophy, and thenar strength is 5/5. There is no intrinsic atrophy, and intrinsic strength is 5/5. All fingers are without triggering and are without pain over the A1 pulley. The patient can make a full composite fist. The hand itself is warm and well perfused. The skin is intact throughout. The contralateral hand/wrist are normal to inspection, range of motion, stability, and strength.    Imaging:  N/A    Assessment:  Patient with bilateral moderate carpal tunnel syndrome. EMG confirmed.  Discussed options at length with patient today.  He would like to proceed with bilateral cortisone injections     Plan:  We will plan to proceed with this in the near future at either [place] or [facility] locations to facilitate insurance issues.      Marizol Wynne MD  Orthopaedic Surgeon

## 2025-03-07 ENCOUNTER — APPOINTMENT (OUTPATIENT)
Dept: ORTHOPEDIC SURGERY | Facility: CLINIC | Age: 65
End: 2025-03-07
Payer: COMMERCIAL

## 2025-03-07 DIAGNOSIS — G56.03 CARPAL TUNNEL SYNDROME, BILATERAL: Primary | ICD-10-CM

## 2025-03-07 RX ORDER — LIDOCAINE HYDROCHLORIDE 10 MG/ML
0.5 INJECTION, SOLUTION INFILTRATION; PERINEURAL
Status: COMPLETED | OUTPATIENT
Start: 2025-03-07 | End: 2025-03-07

## 2025-03-07 RX ORDER — LIDOCAINE HYDROCHLORIDE 10 MG/ML
1 INJECTION, SOLUTION INFILTRATION; PERINEURAL
Status: COMPLETED | OUTPATIENT
Start: 2025-03-07 | End: 2025-03-07

## 2025-03-07 RX ADMIN — LIDOCAINE HYDROCHLORIDE 1 ML: 10 INJECTION, SOLUTION INFILTRATION; PERINEURAL at 14:40

## 2025-03-07 RX ADMIN — LIDOCAINE HYDROCHLORIDE 0.5 ML: 10 INJECTION, SOLUTION INFILTRATION; PERINEURAL at 14:40

## 2025-03-07 NOTE — PROGRESS NOTES
History of Present Illness:  Presents with  bilateral hand numbness. The symptoms have been present for months. The patient denies any inciting trauma. The numbness primarily involves the thumb, index finger, and long finger. There is minimal numbness in the small finger. The patient complains of intermittant, moderate hand pain which is worse at night. It causes frequent night awakenings. The patient presents due to worsening symptoms.  Patient was seen in December 2024, to schedule surgery versus cortisone injections was done at that time.  Patient would like to proceed with cortisone injections to bilateral wrist today.    EMG with bilateral moderate severe carpal tunnel.    Review of Systems   GENERAL: Negative for malaise, significant weight loss, fever  MUSCULOSKELETAL: see HPI  NEURO:  Negative    The patient's past medical history, family history, social history, and review of systems were reviewed. History is otherwise negative except as stated in the HPI.    Physical Examination:  General: Alert and oriented to person, place, and time.  No acute distress and breathing comfortably: Pleasant and cooperative with examination.  HEENT: Head is normocephalic and atraumatic.  Neck: Supple, no visible swelling.  Cardiovascular: No palpable tachycardia  Lungs: No audible wheezing or labored breathing  Abdomen: Nondistended.  On musculoskeletal examination, the patient has full elbow range of motion. There is no tenderness to palpation about the lateral epicondyle. Tinels at the cubital tunnel and elbow flexion/compression are negative for ulnar nerve symptoms. In regards to the wrist, there is no obvious deformity. Range of motion is full in flexion, extension, pronation, and supination. Strength is 5/5 in flexion and extension. There is no tenderness to palpation about the 1st dorsal compartment, the 1st CMC joint, or the TFCC. Tinels at the carpal tunnel and Durkins compression test are positive. The patient  has subjective paresthesias in the median nerve distribution. Sensation and motor function are intact in the radial, and ulnar nerve distribution. There is no obvious thenar atrophy, and thenar strength is 5/5. There is no intrinsic atrophy, and intrinsic strength is 5/5. All fingers are without triggering and are without pain over the A1 pulley. The patient can make a full composite fist. The hand itself is warm and well perfused. The skin is intact throughout. The contralateral hand/wrist are normal to inspection, range of motion, stability, and strength.    Imaging:  N/A    Hand / UE Inj/Asp: R carpal tunnel for carpal tunnel syndrome on 3/7/2025 2:40 PM  Indications: pain and diagnostic  Details: 24 G needle, volar approach  Medications: 10 mg triamcinolone acetonide 10 mg/mL; 0.5 mL lidocaine 10 mg/mL (1 %)  Outcome: tolerated well, no immediate complications  Procedure, treatment alternatives, risks and benefits explained, specific risks discussed. Immediately prior to procedure a time out was called to verify the correct patient, procedure, equipment, support staff and site/side marked as required. Patient was prepped and draped in the usual sterile fashion.       Hand / UE Inj/Asp: L carpal tunnel for carpal tunnel syndrome on 3/7/2025 2:40 PM  Indications: pain and diagnostic  Details: 24 G needle, volar approach  Medications: 10 mg triamcinolone acetonide 10 mg/mL; 1 mL lidocaine 10 mg/mL (1 %)  Outcome: tolerated well, no immediate complications  Procedure, treatment alternatives, risks and benefits explained, specific risks discussed. Immediately prior to procedure a time out was called to verify the correct patient, procedure, equipment, support staff and site/side marked as required. Patient was prepped and draped in the usual sterile fashion.             Assessment:  Patient with bilateral moderate carpal tunnel syndrome. EMG confirmed.  Discussed options at length with patient today.  He would like  to proceed with bilateral cortisone injections     Plan:  Injection.  I explained the risks and benefits of an injection. Specifically, I reviewed the risks of injection, which include, but are not limited to, infection, bleeding, nerve injury, pain, steroid flare, glycemic alteration, subcutaneous fat atrophy, skin hypopigmentation, soft tissue damage, and incomplete symptom relief. At this time, the patient would like to proceed with an injection. After obtaining consent, I injected a 1mL combination of Kenalog and 1% lidocaine into bilateral CT, sterile technique. The injection site was dressed, and the patient tolerated the injection well. I am hopeful that this injection will serve diagnostic, prognostic, and therapeutic purposes. Finally, I have emphasized patience, as any benefit may take several weeks to manifest. Depending on the success of the injection, I will see them back PRTRESA Wynne MD  Orthopaedic Surgeon

## 2025-03-12 DIAGNOSIS — R97.20 ELEVATED PSA: ICD-10-CM

## 2025-03-12 DIAGNOSIS — R97.20 BPH WITH ELEVATED PSA: ICD-10-CM

## 2025-03-12 DIAGNOSIS — N40.0 BPH WITH ELEVATED PSA: ICD-10-CM

## 2025-03-13 ENCOUNTER — OFFICE VISIT (OUTPATIENT)
Dept: URGENT CARE | Age: 65
End: 2025-03-13
Payer: COMMERCIAL

## 2025-03-13 VITALS
OXYGEN SATURATION: 98 % | HEART RATE: 52 BPM | RESPIRATION RATE: 18 BRPM | HEIGHT: 69 IN | SYSTOLIC BLOOD PRESSURE: 168 MMHG | WEIGHT: 185 LBS | BODY MASS INDEX: 27.4 KG/M2 | DIASTOLIC BLOOD PRESSURE: 87 MMHG | TEMPERATURE: 97.8 F

## 2025-03-13 DIAGNOSIS — R05.9 COUGH IN ADULT: ICD-10-CM

## 2025-03-13 DIAGNOSIS — J10.1 INFLUENZA A: Primary | ICD-10-CM

## 2025-03-13 LAB
POC CORONAVIRUS SARS-COV-2 PCR: NEGATIVE
POC HUMAN RHINOVIRUS PCR: NEGATIVE
POC INFLUENZA A VIRUS PCR: POSITIVE
POC INFLUENZA B VIRUS PCR: NEGATIVE
POC RESPIRATORY SYNCYTIAL VIRUS PCR: NEGATIVE

## 2025-03-13 RX ORDER — ALBUTEROL SULFATE 90 UG/1
2 INHALANT RESPIRATORY (INHALATION) EVERY 4 HOURS PRN
Qty: 6.7 G | Refills: 0 | Status: SHIPPED | OUTPATIENT
Start: 2025-03-13 | End: 2026-03-13

## 2025-03-13 RX ORDER — BROMPHENIRAMINE MALEATE, PSEUDOEPHEDRINE HYDROCHLORIDE, AND DEXTROMETHORPHAN HYDROBROMIDE 2; 30; 10 MG/5ML; MG/5ML; MG/5ML
10 SYRUP ORAL 4 TIMES DAILY PRN
Qty: 200 ML | Refills: 0 | Status: SHIPPED | OUTPATIENT
Start: 2025-03-13 | End: 2025-03-18

## 2025-03-13 RX ORDER — PREDNISONE 20 MG/1
20 TABLET ORAL DAILY
Qty: 7 TABLET | Refills: 0 | Status: SHIPPED | OUTPATIENT
Start: 2025-03-13 | End: 2025-03-20

## 2025-03-13 ASSESSMENT — ENCOUNTER SYMPTOMS: COUGH: 1

## 2025-03-13 NOTE — PATIENT INSTRUCTIONS
Acute Cough:  - Secondary to Influenza virus  - Good oral hydration; avoid milk products  - Juan Antonio's Vapor rub; humidifier; warm showers  - Take medications as prescribed  - Advised on s/s to seek emergent care for  - f/u with PCP in the next 3-5 days if no better    Influenza A:  - Symptoms x 1 week; no new symptoms; no fever  - OTC symptomatic treatment at this time  - Advised on CDC protocol  - Advised on s/s to seek emergent care for  - Good oral hydration and rest  - Tylenol/Motrin as needed for fever/body aches

## 2025-03-13 NOTE — PROGRESS NOTES
"Subjective   Patient ID: Betito Vicente is a 64 y.o. male. They present today with a chief complaint of Cough (Cough with congestion in chest, sinus congestion x 1 week).    History of Present Illness  Pt presents to the  with the c/o cough and chest congestion x 1 week. Cough worse at night. Productive at time white. He denies sob, cp or pain with deep inspiration. No fever. No other associated symptoms or concerns to address at this time. Advised to stop all over the counter medications at this time. Take only what is prescribed. Monitor blood pressure with cough medication.       Cough        Past Medical History  Allergies as of 03/13/2025 - Reviewed 03/13/2025   Allergen Reaction Noted    Cephalosporins Rash 03/11/2017       (Not in a hospital admission)       Past Medical History:   Diagnosis Date    CTS (carpal tunnel syndrome)     Hypertension        Past Surgical History:   Procedure Laterality Date    TONSILLECTOMY          reports that he has never smoked. He has never used smokeless tobacco. He reports current alcohol use. He reports that he does not use drugs.    Review of Systems  Review of Systems   Respiratory:  Positive for cough.      10 point ROS completed and all are negative other than what is stated in the current HPI                               Objective    Vitals:    03/13/25 1103   BP: 168/87   BP Location: Left arm   Patient Position: Sitting   Pulse: 52   Resp: 18   Temp: 36.6 °C (97.8 °F)   SpO2: 98%   Weight: 83.9 kg (185 lb)   Height: 1.753 m (5' 9\")     No LMP for male patient.    Physical Exam  Vitals and nursing note reviewed.   Constitutional:       Appearance: Normal appearance.   HENT:      Head: Normocephalic and atraumatic.      Nose: Nose normal.      Mouth/Throat:      Mouth: Mucous membranes are moist.      Comments: Postnasal discharge  Eyes:      Pupils: Pupils are equal, round, and reactive to light.   Cardiovascular:      Rate and Rhythm: Normal rate and regular rhythm. "      Heart sounds: Normal heart sounds.   Pulmonary:      Effort: Pulmonary effort is normal.      Breath sounds: Normal breath sounds. No wheezing.   Musculoskeletal:      Cervical back: No tenderness.      Right lower leg: No edema.      Left lower leg: No edema.   Lymphadenopathy:      Cervical: No cervical adenopathy.   Skin:     General: Skin is warm and dry.      Findings: No rash.   Neurological:      Mental Status: He is alert and oriented to person, place, and time.   Psychiatric:         Behavior: Behavior normal.         Procedures    Point of Care Test & Imaging Results from this visit  Results for orders placed or performed in visit on 03/13/25   POCT SPOTFIRE R/ST Panel Mini w/COVID ([email or web address]) manually resulted    Specimen: Swab   Result Value Ref Range    POC Sars-Cov-2 PCR Negative Negative    POC Respiratory Syncytial Virus PCR Negative Negative    POC Influenza A Virus PCR Positive (A) Negative    POC Influenza B Virus PCR Negative Negative    POC Human Rhinovirus PCR Negative Negative      No results found.    Diagnostic study results (if any) were reviewed by VANNA Pineda.    Assessment/Plan   Allergies, medications, history, and pertinent labs/EKGs/Imaging reviewed by VANNA Pineda.     Medical Decision Making  Acute Cough:  - Secondary to Influenza virus  - Good oral hydration; avoid milk products  - Juan Antonio's Vapor rub; humidifier; warm showers  - Take medications as prescribed  - Advised on s/s to seek emergent care for  - f/u with PCP in the next 3-5 days if no better    Influenza A:  - Symptoms x 1 week; no new symptoms; no fever  - OTC symptomatic treatment at this time  - Advised on CDC protocol  - Advised on s/s to seek emergent care for  - Good oral hydration and rest  - Tylenol/Motrin as needed for fever/body aches    Orders and Diagnoses  Diagnoses and all orders for this visit:  Influenza A  -     predniSONE (Deltasone) 20 mg tablet; Take 1 tablet (20  mg) by mouth once daily for 7 days.  -     brompheniramine-pseudoeph-DM 2-30-10 mg/5 mL syrup; Take 10 mL by mouth 4 times a day as needed for congestion or cough for up to 5 days.  -     albuterol (Proventil HFA) 90 mcg/actuation inhaler; Inhale 2 puffs every 4 hours if needed for wheezing or shortness of breath.  Cough in adult  -     POCT SPOTFIRE R/ST Panel Mini w/COVID ([facility]) manually resulted  -     predniSONE (Deltasone) 20 mg tablet; Take 1 tablet (20 mg) by mouth once daily for 7 days.  -     brompheniramine-pseudoeph-DM 2-30-10 mg/5 mL syrup; Take 10 mL by mouth 4 times a day as needed for congestion or cough for up to 5 days.  -     albuterol (Proventil HFA) 90 mcg/actuation inhaler; Inhale 2 puffs every 4 hours if needed for wheezing or shortness of breath.      Medical Admin Record      Patient disposition: Home    Electronically signed by VANNA Pineda  11:50 AM

## 2025-03-25 ENCOUNTER — APPOINTMENT (OUTPATIENT)
Dept: UROLOGY | Facility: CLINIC | Age: 65
End: 2025-03-25
Payer: COMMERCIAL

## 2025-03-25 VITALS — HEART RATE: 67 BPM | TEMPERATURE: 97.4 F | DIASTOLIC BLOOD PRESSURE: 73 MMHG | SYSTOLIC BLOOD PRESSURE: 128 MMHG

## 2025-03-25 DIAGNOSIS — R97.20 ELEVATED PSA: ICD-10-CM

## 2025-03-25 DIAGNOSIS — N40.0 BPH WITH ELEVATED PSA: ICD-10-CM

## 2025-03-25 DIAGNOSIS — R97.20 BPH WITH ELEVATED PSA: ICD-10-CM

## 2025-03-25 DIAGNOSIS — R39.16 STRAINING TO VOID: ICD-10-CM

## 2025-03-25 PROCEDURE — 99213 OFFICE O/P EST LOW 20 MIN: CPT | Performed by: UROLOGY

## 2025-03-25 PROCEDURE — G2211 COMPLEX E/M VISIT ADD ON: HCPCS | Performed by: UROLOGY

## 2025-03-25 RX ORDER — TAMSULOSIN HYDROCHLORIDE 0.4 MG/1
0.4 CAPSULE ORAL DAILY
Qty: 90 CAPSULE | Refills: 3 | Status: SHIPPED | OUTPATIENT
Start: 2025-03-25 | End: 2026-03-25

## 2025-03-25 RX ORDER — FINASTERIDE 5 MG/1
5 TABLET, FILM COATED ORAL DAILY
Qty: 90 TABLET | Refills: 3 | Status: SHIPPED | OUTPATIENT
Start: 2025-03-25 | End: 2026-03-25

## 2025-03-25 NOTE — PROGRESS NOTES
Subjective   Patient ID: Betito Vicente is a 64 y.o. male who presents for PSA results. Last seen 9/24/24 when Biopsy findings of prostatic tissue with patchy inflammation and negative for malignancy. Order finasteride 5 mg daily. The patient is aware of possible side effects and understands that it will take 6 to 12 months to become effective. Order PSA for 6 months and follow-up with results.     HPI  The patient states urination is better on finasteride and tamsulosin. The patient did not get a PSA today.       PSA Results  Component  Ref Range & Units 1 yr ago 3 yr ago   Prostate Specific Antigen,Screen  <=4.00 ng/mL 6.80 High  4.30 High  R, C     Review of Systems  A 12 system review was completed and is negative with the exception of those signs and symptoms noted in the history of present illness.    Objective   Physical Exam  General: in NAD, appears stated age  Head: normocephalic, atraumatic  Respiratory: normal effort, no use of accessory muscles  Cardiovascular: no edema noted  Skin: normal turgor, no rashes  Neurologic: grossly intact, oriented to person/place/time  Psychiatric: mode and affect appropriate     Assessment/Plan   Problem List Items Addressed This Visit             ICD-10-CM    Elevated PSA R97.20    Relevant Medications    finasteride (Proscar) 5 mg tablet    Other Relevant Orders    Follow Up In Urology    Prostate Specific Antigen     Other Visit Diagnoses         Codes    BPH with elevated PSA     N40.0, R97.20    Relevant Medications    finasteride (Proscar) 5 mg tablet    Straining to void     R39.16    Relevant Medications    tamsulosin (Flomax) 0.4 mg 24 hr capsule          Order PSA for now and again in 6 months. Refill tamsulosin 0.4 mg and finasteride 5 mg from [facility]. The patient will follow-up with [facility] Ascencio in 6 months.       Scribe Attestation  By signing my name below, I, Ariana Calero   attest that this documentation has been prepared under the direction  and in the presence of Mohan Kumar MD.

## 2025-03-26 LAB — PSA SERPL-MCNC: 2.48 NG/ML

## 2025-04-06 DIAGNOSIS — I10 PRIMARY HYPERTENSION: ICD-10-CM

## 2025-04-07 RX ORDER — VALSARTAN 80 MG/1
80 TABLET ORAL DAILY
Qty: 90 TABLET | Refills: 3 | OUTPATIENT
Start: 2025-04-07

## 2025-04-07 NOTE — TELEPHONE ENCOUNTER
Last seen 09/23/24. Patient needs an appointment. Please call to schedule, and let us know if a short supply is needed. Thank you!     Edynt message was sent as well.

## 2025-05-03 DIAGNOSIS — I10 PRIMARY HYPERTENSION: ICD-10-CM

## 2025-05-06 RX ORDER — VALSARTAN 80 MG/1
80 TABLET ORAL DAILY
Qty: 30 TABLET | Refills: 0 | Status: SHIPPED | OUTPATIENT
Start: 2025-05-06

## 2025-05-27 NOTE — PROGRESS NOTES
"Subjective   Patient ID: Betito Vicente is a 64 y.o. male who presents for Annual Exam and Hypertension.  HPI    Patient presents in office today for an Annual physical. Last eye exam 2 years ago, last dental exam 2 years ago. No new family h/o of cancer or heart disease. Does not need paperwork filled out today.     HTN follow up  Denies chest pain,SOB, swelling, headaches, lightheadedness or dizziness.   Eats a generally healthy diet, Exercises.   Does check BP at home.   Currently taking valsartan 80 mg.  Medication working well.    Repeat /58.    Last PSA on 3/25/25 was 2.48. Taking Finasteride and Tamsulosin once daily.    He occasionally experiences mild acid reflux.    He occasionally drinks wine, but can do months without drinking.    No changes in medication noted.      Review of systems  ; Patient seen today for exam denies any problems with headaches or vision, denies any shortness of breath chest pain nausea or vomiting, no black stool no blood in the stool no heartburn type symptoms denies any problems with constipation or diarrhea, and no dysuria-type symptoms    The patient's allergies medications were reviewed with them today    The patient's social family and surgical history or also reviewed here today, along with her past medical history.     Objective     Alert and active in  no acute distress  HEENT TMs clear oropharynx negative nares clear no drainage noted neck supple  With no adenopathy   Heart regular rate and rhythm without murmur and no carotid bruits  Lungs- clear to auscultation bilaterally, no wheeze or rhonchi noted  Thyroid -negative masses or nodularity  Abdomen- soft times four quadrants , bowel sounds positive no masses or organomegaly, negative tenderness guarding or rebound    skin -no lesions noted      /80 (BP Location: Left arm, Patient Position: Sitting, BP Cuff Size: Large adult)   Pulse 75   Temp 36.6 °C (97.8 °F) (Temporal)   Ht 1.753 m (5' 9\")   Wt 84.8 kg " (187 lb)   SpO2 95%   BMI 27.62 kg/m²         Assessment/Plan   Problem List Items Addressed This Visit       Gastroesophageal reflux disease with esophagitis    Routine general medical examination at a health care facility - Primary    Relevant Orders    CBC and Auto Differential    Comprehensive Metabolic Panel    Lipid Panel    BMI 27.0-27.9,adult     Other Visit Diagnoses         Primary hypertension        Relevant Medications    valsartan (Diovan) 80 mg tablet      Benign prostatic hyperplasia without lower urinary tract symptoms                Patient is overall doing well with the current medication regimen and will continue with the medications.    Reviewed PSA from 3/25/25, WNL.  Continue Finasteride and Tamsulosin at the current dose.  Check PSA every 6 months.    HTN well-controlled.  Refilled Valsartan. Continue at the current dose.     Recommended no sugar, sugary drinks, iced or sweet tea to prevent acid reflux.    Labs have been ordered, she/he will have these performed and we will contact her/him with results.  (CBC, CMP, Lipid)     If anything worsens or changes please call us at once, follow up in the office as planned,       Scribe Attestation  By signing my name below, I, Ariana Escalante   attest that this documentation has been prepared under the direction and in the presence of Manolo Escamilla DO.    All medical record entries made by the Scribe were at my direction and personally dictated by me.   I have reviewed the chart and agree that the record accurately reflects my personal performance of the history, physical exam, discussion, and plan.

## 2025-05-28 ENCOUNTER — APPOINTMENT (OUTPATIENT)
Dept: PRIMARY CARE | Facility: CLINIC | Age: 65
End: 2025-05-28
Payer: COMMERCIAL

## 2025-05-28 VITALS
BODY MASS INDEX: 27.7 KG/M2 | TEMPERATURE: 97.8 F | OXYGEN SATURATION: 95 % | HEART RATE: 75 BPM | WEIGHT: 187 LBS | SYSTOLIC BLOOD PRESSURE: 124 MMHG | HEIGHT: 69 IN | DIASTOLIC BLOOD PRESSURE: 80 MMHG

## 2025-05-28 DIAGNOSIS — Z00.00 ROUTINE GENERAL MEDICAL EXAMINATION AT A HEALTH CARE FACILITY: Primary | ICD-10-CM

## 2025-05-28 DIAGNOSIS — I10 PRIMARY HYPERTENSION: ICD-10-CM

## 2025-05-28 DIAGNOSIS — N40.0 BENIGN PROSTATIC HYPERPLASIA WITHOUT LOWER URINARY TRACT SYMPTOMS: ICD-10-CM

## 2025-05-28 DIAGNOSIS — K21.00 GASTROESOPHAGEAL REFLUX DISEASE WITH ESOPHAGITIS WITHOUT HEMORRHAGE: ICD-10-CM

## 2025-05-28 PROCEDURE — 3074F SYST BP LT 130 MM HG: CPT | Performed by: FAMILY MEDICINE

## 2025-05-28 PROCEDURE — 3079F DIAST BP 80-89 MM HG: CPT | Performed by: FAMILY MEDICINE

## 2025-05-28 PROCEDURE — 1036F TOBACCO NON-USER: CPT | Performed by: FAMILY MEDICINE

## 2025-05-28 PROCEDURE — 99396 PREV VISIT EST AGE 40-64: CPT | Performed by: FAMILY MEDICINE

## 2025-05-28 PROCEDURE — 3008F BODY MASS INDEX DOCD: CPT | Performed by: FAMILY MEDICINE

## 2025-05-28 RX ORDER — VALSARTAN 80 MG/1
80 TABLET ORAL DAILY
Qty: 90 TABLET | Refills: 2 | Status: SHIPPED | OUTPATIENT
Start: 2025-05-28

## 2025-05-28 ASSESSMENT — PROMIS GLOBAL HEALTH SCALE
RATE_MENTAL_HEALTH: VERY GOOD
EMOTIONAL_PROBLEMS: RARELY
RATE_AVERAGE_FATIGUE: MILD
CARRYOUT_PHYSICAL_ACTIVITIES: COMPLETELY
RATE_GENERAL_HEALTH: GOOD
RATE_QUALITY_OF_LIFE: GOOD
RATE_SOCIAL_SATISFACTION: GOOD
RATE_AVERAGE_PAIN: 3
RATE_PHYSICAL_HEALTH: GOOD
CARRYOUT_SOCIAL_ACTIVITIES: GOOD

## 2025-05-28 ASSESSMENT — PATIENT HEALTH QUESTIONNAIRE - PHQ9
SUM OF ALL RESPONSES TO PHQ9 QUESTIONS 1 AND 2: 0
1. LITTLE INTEREST OR PLEASURE IN DOING THINGS: NOT AT ALL
2. FEELING DOWN, DEPRESSED OR HOPELESS: NOT AT ALL

## 2025-09-26 ENCOUNTER — APPOINTMENT (OUTPATIENT)
Dept: UROLOGY | Facility: CLINIC | Age: 65
End: 2025-09-26
Payer: COMMERCIAL

## 2025-11-26 ENCOUNTER — APPOINTMENT (OUTPATIENT)
Dept: PRIMARY CARE | Facility: CLINIC | Age: 65
End: 2025-11-26
Payer: COMMERCIAL